# Patient Record
Sex: FEMALE | Race: BLACK OR AFRICAN AMERICAN | NOT HISPANIC OR LATINO | Employment: OTHER | URBAN - METROPOLITAN AREA
[De-identification: names, ages, dates, MRNs, and addresses within clinical notes are randomized per-mention and may not be internally consistent; named-entity substitution may affect disease eponyms.]

---

## 2017-07-12 ENCOUNTER — HISTORICAL (OUTPATIENT)
Dept: RADIOLOGY | Facility: HOSPITAL | Age: 67
End: 2017-07-12

## 2017-07-31 ENCOUNTER — HISTORICAL (OUTPATIENT)
Dept: RADIOLOGY | Facility: HOSPITAL | Age: 67
End: 2017-07-31

## 2017-12-25 ENCOUNTER — HOSPITAL ENCOUNTER (OUTPATIENT)
Dept: MEDSURG UNIT | Facility: HOSPITAL | Age: 67
End: 2017-12-27
Attending: INTERNAL MEDICINE | Admitting: INTERNAL MEDICINE

## 2017-12-25 LAB
ABS NEUT (OLG): 5.12 X10(3)/MCL (ref 2.1–9.2)
ALBUMIN SERPL-MCNC: 3.4 GM/DL (ref 3.4–5)
ALBUMIN/GLOB SERPL: 1 {RATIO}
ALP SERPL-CCNC: 60 UNIT/L (ref 38–126)
ALT SERPL-CCNC: 35 UNIT/L (ref 12–78)
APTT PPP: 35.5 SECOND(S) (ref 24.8–36.9)
AST SERPL-CCNC: 48 UNIT/L (ref 15–37)
BASOPHILS # BLD AUTO: 0 X10(3)/MCL (ref 0–0.2)
BASOPHILS NFR BLD AUTO: 0 %
BILIRUB SERPL-MCNC: 1 MG/DL (ref 0.2–1)
BILIRUBIN DIRECT+TOT PNL SERPL-MCNC: 0.3 MG/DL (ref 0–0.2)
BILIRUBIN DIRECT+TOT PNL SERPL-MCNC: 0.7 MG/DL (ref 0–0.8)
BUN SERPL-MCNC: 18 MG/DL (ref 7–18)
CALCIUM SERPL-MCNC: 8.2 MG/DL (ref 8.5–10.1)
CHLORIDE SERPL-SCNC: 101 MMOL/L (ref 98–107)
CO2 SERPL-SCNC: 25 MMOL/L (ref 21–32)
CREAT SERPL-MCNC: 1.11 MG/DL (ref 0.55–1.02)
EOSINOPHIL # BLD AUTO: 0.1 X10(3)/MCL (ref 0–0.9)
EOSINOPHIL NFR BLD AUTO: 1 %
ERYTHROCYTE [DISTWIDTH] IN BLOOD BY AUTOMATED COUNT: 13.6 % (ref 11.5–17)
GLOBULIN SER-MCNC: 3.3 GM/DL (ref 2.4–3.5)
GLUCOSE SERPL-MCNC: 87 MG/DL (ref 74–106)
HCT VFR BLD AUTO: 32.6 % (ref 37–47)
HGB BLD-MCNC: 10.6 GM/DL (ref 12–16)
INR PPP: 1.05 (ref 0–1.27)
LYMPHOCYTES # BLD AUTO: 2 X10(3)/MCL (ref 0.6–4.6)
LYMPHOCYTES NFR BLD AUTO: 26 %
MCH RBC QN AUTO: 31 PG (ref 27–31)
MCHC RBC AUTO-ENTMCNC: 32.5 GM/DL (ref 33–36)
MCV RBC AUTO: 95.3 FL (ref 80–94)
MONOCYTES # BLD AUTO: 0.3 X10(3)/MCL (ref 0.1–1.3)
MONOCYTES NFR BLD AUTO: 4 %
NEUTROPHILS # BLD AUTO: 5.12 X10(3)/MCL (ref 1.4–7.9)
NEUTROPHILS NFR BLD AUTO: 68 %
PLATELET # BLD AUTO: 173 X10(3)/MCL (ref 130–400)
PMV BLD AUTO: 10.5 FL (ref 9.4–12.4)
POTASSIUM SERPL-SCNC: 3.9 MMOL/L (ref 3.5–5.1)
PROT SERPL-MCNC: 6.7 GM/DL (ref 6.4–8.2)
PROTHROMBIN TIME: 14 SECOND(S) (ref 12.2–14.7)
RBC # BLD AUTO: 3.42 X10(6)/MCL (ref 4.2–5.4)
SODIUM SERPL-SCNC: 135 MMOL/L (ref 136–145)
WBC # SPEC AUTO: 7.6 X10(3)/MCL (ref 4.5–11.5)

## 2017-12-26 LAB
ABS NEUT (OLG): 2.92 X10(3)/MCL (ref 2.1–9.2)
BASOPHILS # BLD AUTO: 0 X10(3)/MCL (ref 0–0.2)
BASOPHILS NFR BLD AUTO: 0 %
BUN SERPL-MCNC: 11 MG/DL (ref 7–18)
CALCIUM SERPL-MCNC: 7.8 MG/DL (ref 8.5–10.1)
CHLORIDE SERPL-SCNC: 104 MMOL/L (ref 98–107)
CO2 SERPL-SCNC: 24 MMOL/L (ref 21–32)
CREAT SERPL-MCNC: 0.76 MG/DL (ref 0.55–1.02)
EOSINOPHIL # BLD AUTO: 0.2 X10(3)/MCL (ref 0–0.9)
EOSINOPHIL NFR BLD AUTO: 3 %
ERYTHROCYTE [DISTWIDTH] IN BLOOD BY AUTOMATED COUNT: 13.5 % (ref 11.5–17)
GLUCOSE SERPL-MCNC: 74 MG/DL (ref 74–106)
HCT VFR BLD AUTO: 28.8 % (ref 37–47)
HGB BLD-MCNC: 9.2 GM/DL (ref 12–16)
LYMPHOCYTES # BLD AUTO: 1.6 X10(3)/MCL (ref 0.6–4.6)
LYMPHOCYTES NFR BLD AUTO: 32 %
MCH RBC QN AUTO: 30.8 PG (ref 27–31)
MCHC RBC AUTO-ENTMCNC: 31.9 GM/DL (ref 33–36)
MCV RBC AUTO: 96.3 FL (ref 80–94)
MONOCYTES # BLD AUTO: 0.3 X10(3)/MCL (ref 0.1–1.3)
MONOCYTES NFR BLD AUTO: 5 %
NEUTROPHILS # BLD AUTO: 2.92 X10(3)/MCL (ref 1.4–7.9)
NEUTROPHILS NFR BLD AUTO: 59 %
PLATELET # BLD AUTO: 160 X10(3)/MCL (ref 130–400)
PMV BLD AUTO: 10.2 FL (ref 9.4–12.4)
POTASSIUM SERPL-SCNC: 3.8 MMOL/L (ref 3.5–5.1)
RBC # BLD AUTO: 2.99 X10(6)/MCL (ref 4.2–5.4)
SODIUM SERPL-SCNC: 137 MMOL/L (ref 136–145)
TROPONIN I SERPL-MCNC: <0.02 NG/ML (ref 0.02–0.49)
WBC # SPEC AUTO: 5 X10(3)/MCL (ref 4.5–11.5)

## 2017-12-27 LAB
ABS NEUT (OLG): 2.39 X10(3)/MCL (ref 2.1–9.2)
BASOPHILS # BLD AUTO: 0 X10(3)/MCL (ref 0–0.2)
BASOPHILS NFR BLD AUTO: 0 %
BUN SERPL-MCNC: 9 MG/DL (ref 7–18)
CALCIUM SERPL-MCNC: 8.4 MG/DL (ref 8.5–10.1)
CHLORIDE SERPL-SCNC: 103 MMOL/L (ref 98–107)
CO2 SERPL-SCNC: 28 MMOL/L (ref 21–32)
CREAT SERPL-MCNC: 0.81 MG/DL (ref 0.55–1.02)
EOSINOPHIL # BLD AUTO: 0.3 X10(3)/MCL (ref 0–0.9)
EOSINOPHIL NFR BLD AUTO: 6 %
ERYTHROCYTE [DISTWIDTH] IN BLOOD BY AUTOMATED COUNT: 13.2 % (ref 11.5–17)
GLUCOSE SERPL-MCNC: 82 MG/DL (ref 74–106)
HCT VFR BLD AUTO: 29.6 % (ref 37–47)
HGB BLD-MCNC: 9.5 GM/DL (ref 12–16)
LYMPHOCYTES # BLD AUTO: 1.8 X10(3)/MCL (ref 0.6–4.6)
LYMPHOCYTES NFR BLD AUTO: 38 %
MAGNESIUM SERPL-MCNC: 2.1 MG/DL (ref 1.8–2.4)
MCH RBC QN AUTO: 30.4 PG (ref 27–31)
MCHC RBC AUTO-ENTMCNC: 32.1 GM/DL (ref 33–36)
MCV RBC AUTO: 94.9 FL (ref 80–94)
MONOCYTES # BLD AUTO: 0.3 X10(3)/MCL (ref 0.1–1.3)
MONOCYTES NFR BLD AUTO: 6 %
NEUTROPHILS # BLD AUTO: 2.39 X10(3)/MCL (ref 2.1–9.2)
NEUTROPHILS NFR BLD AUTO: 50 %
PLATELET # BLD AUTO: 171 X10(3)/MCL (ref 130–400)
PMV BLD AUTO: 10.6 FL (ref 9.4–12.4)
POTASSIUM SERPL-SCNC: 4.3 MMOL/L (ref 3.5–5.1)
RBC # BLD AUTO: 3.12 X10(6)/MCL (ref 4.2–5.4)
SODIUM SERPL-SCNC: 136 MMOL/L (ref 136–145)
WBC # SPEC AUTO: 4.8 X10(3)/MCL (ref 4.5–11.5)

## 2018-05-29 ENCOUNTER — HISTORICAL (OUTPATIENT)
Dept: ADMINISTRATIVE | Facility: HOSPITAL | Age: 68
End: 2018-05-29

## 2018-05-29 LAB
ABS NEUT (OLG): 4.14 X10(3)/MCL (ref 2.1–9.2)
ALBUMIN SERPL-MCNC: 3.7 GM/DL (ref 3.4–5)
ALBUMIN/GLOB SERPL: 1.2 RATIO (ref 1.1–2)
ALP SERPL-CCNC: 87 UNIT/L (ref 38–126)
ALT SERPL-CCNC: 39 UNIT/L (ref 12–78)
APPEARANCE, UA: CLEAR
AST SERPL-CCNC: 23 UNIT/L (ref 15–37)
BACTERIA SPEC CULT: ABNORMAL /HPF
BASOPHILS # BLD AUTO: 0.1 X10(3)/MCL (ref 0–0.2)
BASOPHILS NFR BLD AUTO: 1 %
BILIRUB SERPL-MCNC: 0.9 MG/DL (ref 0.2–1)
BILIRUB UR QL STRIP: NEGATIVE
BILIRUBIN DIRECT+TOT PNL SERPL-MCNC: 0.2 MG/DL (ref 0–0.5)
BILIRUBIN DIRECT+TOT PNL SERPL-MCNC: 0.7 MG/DL (ref 0–0.8)
BUN SERPL-MCNC: 14 MG/DL (ref 7–18)
CALCIUM SERPL-MCNC: 9.4 MG/DL (ref 8.5–10.1)
CHLORIDE SERPL-SCNC: 106 MMOL/L (ref 98–107)
CHOLEST SERPL-MCNC: 124 MG/DL (ref 0–200)
CHOLEST/HDLC SERPL: 2 {RATIO} (ref 0–4)
CO2 SERPL-SCNC: 30 MMOL/L (ref 21–32)
COLOR UR: YELLOW
CREAT SERPL-MCNC: 1.06 MG/DL (ref 0.55–1.02)
DEPRECATED CALCIDIOL+CALCIFEROL SERPL-MC: 39 NG/ML (ref 30–80)
EOSINOPHIL # BLD AUTO: 0.4 X10(3)/MCL (ref 0–0.9)
EOSINOPHIL NFR BLD AUTO: 5 %
ERYTHROCYTE [DISTWIDTH] IN BLOOD BY AUTOMATED COUNT: 13.5 % (ref 11.5–17)
GLOBULIN SER-MCNC: 3.2 GM/DL (ref 2.4–3.5)
GLUCOSE (UA): NEGATIVE
GLUCOSE SERPL-MCNC: 84 MG/DL (ref 74–106)
HCT VFR BLD AUTO: 36.9 % (ref 37–47)
HDLC SERPL-MCNC: 61 MG/DL (ref 35–60)
HGB BLD-MCNC: 11.5 GM/DL (ref 12–16)
HGB UR QL STRIP: NEGATIVE
KETONES UR QL STRIP: NEGATIVE
LDLC SERPL CALC-MCNC: 52 MG/DL (ref 0–129)
LEUKOCYTE ESTERASE UR QL STRIP: ABNORMAL
LYMPHOCYTES # BLD AUTO: 2.5 X10(3)/MCL (ref 0.6–4.6)
LYMPHOCYTES NFR BLD AUTO: 33 %
MCH RBC QN AUTO: 30.7 PG (ref 27–31)
MCHC RBC AUTO-ENTMCNC: 31.2 GM/DL (ref 33–36)
MCV RBC AUTO: 98.7 FL (ref 80–94)
MONOCYTES # BLD AUTO: 0.4 X10(3)/MCL (ref 0.1–1.3)
MONOCYTES NFR BLD AUTO: 5 %
NEUTROPHILS # BLD AUTO: 4.14 X10(3)/MCL (ref 2.1–9.2)
NEUTROPHILS NFR BLD AUTO: 55 %
NITRITE UR QL STRIP: NEGATIVE
PH UR STRIP: 7 [PH] (ref 5–9)
PLATELET # BLD AUTO: 214 X10(3)/MCL (ref 130–400)
PMV BLD AUTO: 10 FL (ref 9.4–12.4)
POTASSIUM SERPL-SCNC: 4.1 MMOL/L (ref 3.5–5.1)
PROT SERPL-MCNC: 6.9 GM/DL (ref 6.4–8.2)
PROT UR QL STRIP: NEGATIVE
RBC # BLD AUTO: 3.74 X10(6)/MCL (ref 4.2–5.4)
RBC #/AREA URNS HPF: ABNORMAL /[HPF]
SODIUM SERPL-SCNC: 141 MMOL/L (ref 136–145)
SP GR UR STRIP: 1.02 (ref 1–1.03)
SQUAMOUS EPITHELIAL, UA: 7 /HPF (ref 0–4)
TRIGL SERPL-MCNC: 53 MG/DL (ref 30–150)
TSH SERPL-ACNC: 1.52 MIU/L (ref 0.36–3.74)
UROBILINOGEN UR STRIP-ACNC: 1
VLDLC SERPL CALC-MCNC: 11 MG/DL
WBC # SPEC AUTO: 7.5 X10(3)/MCL (ref 4.5–11.5)
WBC #/AREA URNS HPF: 5 /HPF (ref 0–3)

## 2018-06-12 ENCOUNTER — HISTORICAL (OUTPATIENT)
Dept: ADMINISTRATIVE | Facility: HOSPITAL | Age: 68
End: 2018-06-12

## 2018-06-12 LAB
ABS NEUT (OLG): 3.74 X10(3)/MCL (ref 2.1–9.2)
BASOPHILS # BLD AUTO: 0 X10(3)/MCL (ref 0–0.2)
BASOPHILS NFR BLD AUTO: 1 %
EOSINOPHIL # BLD AUTO: 0.5 X10(3)/MCL (ref 0–0.9)
EOSINOPHIL NFR BLD AUTO: 8 %
ERYTHROCYTE [DISTWIDTH] IN BLOOD BY AUTOMATED COUNT: 13.7 % (ref 11.5–17)
FOLATE SERPL-MCNC: 19.7 NG/ML (ref 3.1–17.5)
HCT VFR BLD AUTO: 35.8 % (ref 37–47)
HGB BLD-MCNC: 11 GM/DL (ref 12–16)
IRON SATN MFR SERPL: 29.4 % (ref 20–50)
IRON SERPL-MCNC: 87 MCG/DL (ref 50–175)
LYMPHOCYTES # BLD AUTO: 2.5 X10(3)/MCL (ref 0.6–4.6)
LYMPHOCYTES NFR BLD AUTO: 35 %
MCH RBC QN AUTO: 30.8 PG (ref 27–31)
MCHC RBC AUTO-ENTMCNC: 30.7 GM/DL (ref 33–36)
MCV RBC AUTO: 100.3 FL (ref 80–94)
MONOCYTES # BLD AUTO: 0.3 X10(3)/MCL (ref 0.1–1.3)
MONOCYTES NFR BLD AUTO: 5 %
NEUTROPHILS # BLD AUTO: 3.74 X10(3)/MCL (ref 2.1–9.2)
NEUTROPHILS NFR BLD AUTO: 52 %
PLATELET # BLD AUTO: 240 X10(3)/MCL (ref 130–400)
PMV BLD AUTO: 10.5 FL (ref 9.4–12.4)
RBC # BLD AUTO: 3.57 X10(6)/MCL (ref 4.2–5.4)
RET# (OHS): 0.08 X10^6/ML (ref 0.02–0.08)
RETICULOCYTE COUNT AUTOMATED (OLG): 2.2 % (ref 1.1–2.1)
TIBC SERPL-MCNC: 296 MCG/DL (ref 250–450)
TRANSFERRIN SERPL-MCNC: 231 MG/DL (ref 200–360)
VIT B12 SERPL-MCNC: 651 PG/ML (ref 193–986)
WBC # SPEC AUTO: 7.1 X10(3)/MCL (ref 4.5–11.5)

## 2018-07-20 ENCOUNTER — HISTORICAL (OUTPATIENT)
Dept: ADMINISTRATIVE | Facility: HOSPITAL | Age: 68
End: 2018-07-20

## 2018-07-20 LAB
ABS NEUT (OLG): 4.79 X10(3)/MCL (ref 2.1–9.2)
BASOPHILS # BLD AUTO: 0 X10(3)/MCL (ref 0–0.2)
BASOPHILS NFR BLD AUTO: 1 %
EOSINOPHIL # BLD AUTO: 0.5 X10(3)/MCL (ref 0–0.9)
EOSINOPHIL NFR BLD AUTO: 7 %
ERYTHROCYTE [DISTWIDTH] IN BLOOD BY AUTOMATED COUNT: 14.1 % (ref 11.5–17)
HCT VFR BLD AUTO: 34.5 % (ref 37–47)
HGB BLD-MCNC: 10.9 GM/DL (ref 12–16)
LYMPHOCYTES # BLD AUTO: 2 X10(3)/MCL (ref 0.6–4.6)
LYMPHOCYTES NFR BLD AUTO: 26 %
MCH RBC QN AUTO: 32 PG (ref 27–31)
MCHC RBC AUTO-ENTMCNC: 31.6 GM/DL (ref 33–36)
MCV RBC AUTO: 101.2 FL (ref 80–94)
MONOCYTES # BLD AUTO: 0.3 X10(3)/MCL (ref 0.1–1.3)
MONOCYTES NFR BLD AUTO: 3 %
NEUTROPHILS # BLD AUTO: 4.79 X10(3)/MCL (ref 1.4–7.9)
NEUTROPHILS NFR BLD AUTO: 63 %
PLATELET # BLD AUTO: 214 X10(3)/MCL (ref 130–400)
PMV BLD AUTO: 10.3 FL (ref 9.4–12.4)
RBC # BLD AUTO: 3.41 X10(6)/MCL (ref 4.2–5.4)
WBC # SPEC AUTO: 7.6 X10(3)/MCL (ref 4.5–11.5)

## 2018-08-18 LAB — HEMOCCULT SP2 STL QL: NEGATIVE

## 2018-08-20 ENCOUNTER — HISTORICAL (OUTPATIENT)
Dept: LAB | Facility: HOSPITAL | Age: 68
End: 2018-08-20

## 2018-09-05 ENCOUNTER — HISTORICAL (OUTPATIENT)
Dept: ADMINISTRATIVE | Facility: HOSPITAL | Age: 68
End: 2018-09-05

## 2018-09-05 LAB
ABS NEUT (OLG): 3.21 X10(3)/MCL (ref 2.1–9.2)
BASOPHILS # BLD AUTO: 0 X10(3)/MCL (ref 0–0.2)
BASOPHILS NFR BLD AUTO: 1 %
EOSINOPHIL # BLD AUTO: 0.5 X10(3)/MCL (ref 0–0.9)
EOSINOPHIL NFR BLD AUTO: 8 %
ERYTHROCYTE [DISTWIDTH] IN BLOOD BY AUTOMATED COUNT: 13.2 % (ref 11.5–17)
HCT VFR BLD AUTO: 37.6 % (ref 37–47)
HGB BLD-MCNC: 12 GM/DL (ref 12–16)
LYMPHOCYTES # BLD AUTO: 2.2 X10(3)/MCL (ref 0.6–4.6)
LYMPHOCYTES NFR BLD AUTO: 35 %
MCH RBC QN AUTO: 31.6 PG (ref 27–31)
MCHC RBC AUTO-ENTMCNC: 31.9 GM/DL (ref 33–36)
MCV RBC AUTO: 98.9 FL (ref 80–94)
MONOCYTES # BLD AUTO: 0.3 X10(3)/MCL (ref 0.1–1.3)
MONOCYTES NFR BLD AUTO: 5 %
NEUTROPHILS # BLD AUTO: 3.21 X10(3)/MCL (ref 1.4–7.9)
NEUTROPHILS NFR BLD AUTO: 51 %
PLATELET # BLD AUTO: 201 X10(3)/MCL (ref 130–400)
PMV BLD AUTO: 11.2 FL (ref 9.4–12.4)
RBC # BLD AUTO: 3.8 X10(6)/MCL (ref 4.2–5.4)
WBC # SPEC AUTO: 6.4 X10(3)/MCL (ref 4.5–11.5)

## 2018-09-19 ENCOUNTER — HISTORICAL (OUTPATIENT)
Dept: ADMINISTRATIVE | Facility: HOSPITAL | Age: 68
End: 2018-09-19

## 2018-10-01 ENCOUNTER — HISTORICAL (OUTPATIENT)
Dept: RADIOLOGY | Facility: HOSPITAL | Age: 68
End: 2018-10-01

## 2018-10-16 ENCOUNTER — HISTORICAL (OUTPATIENT)
Dept: ADMINISTRATIVE | Facility: HOSPITAL | Age: 68
End: 2018-10-16

## 2018-10-16 LAB
HBV SURFACE AG SERPL QL IA: NEGATIVE
HCV AB SERPL QL IA: NEGATIVE

## 2018-10-23 ENCOUNTER — HISTORICAL (OUTPATIENT)
Dept: RADIOLOGY | Facility: HOSPITAL | Age: 68
End: 2018-10-23

## 2018-11-26 ENCOUNTER — HISTORICAL (OUTPATIENT)
Dept: ADMINISTRATIVE | Facility: HOSPITAL | Age: 68
End: 2018-11-26

## 2018-11-26 LAB
ABS NEUT (OLG): 4.27 X10(3)/MCL (ref 2.1–9.2)
ALBUMIN SERPL-MCNC: 4 GM/DL (ref 3.4–5)
ALBUMIN/GLOB SERPL: 1.2 RATIO (ref 1.1–2)
ALP SERPL-CCNC: 76 UNIT/L (ref 38–126)
ALT SERPL-CCNC: 39 UNIT/L (ref 12–78)
AST SERPL-CCNC: 24 UNIT/L (ref 15–37)
BASOPHILS # BLD AUTO: 0 X10(3)/MCL (ref 0–0.2)
BASOPHILS NFR BLD AUTO: 0 %
BILIRUB SERPL-MCNC: 0.5 MG/DL (ref 0.2–1)
BILIRUBIN DIRECT+TOT PNL SERPL-MCNC: 0.2 MG/DL (ref 0–0.5)
BILIRUBIN DIRECT+TOT PNL SERPL-MCNC: 0.3 MG/DL (ref 0–0.8)
BUN SERPL-MCNC: 10 MG/DL (ref 7–18)
CALCIUM SERPL-MCNC: 9.5 MG/DL (ref 8.5–10.1)
CHLORIDE SERPL-SCNC: 106 MMOL/L (ref 98–107)
CO2 SERPL-SCNC: 30 MMOL/L (ref 21–32)
CREAT SERPL-MCNC: 0.96 MG/DL (ref 0.55–1.02)
EOSINOPHIL # BLD AUTO: 0.2 X10(3)/MCL (ref 0–0.9)
EOSINOPHIL NFR BLD AUTO: 2 %
ERYTHROCYTE [DISTWIDTH] IN BLOOD BY AUTOMATED COUNT: 13.4 % (ref 11.5–17)
ERYTHROCYTE [SEDIMENTATION RATE] IN BLOOD: 8 MM/HR (ref 0–20)
FOLATE SERPL-MCNC: 36.2 NG/ML (ref 3.1–17.5)
GLOBULIN SER-MCNC: 3.4 GM/DL (ref 2.4–3.5)
GLUCOSE SERPL-MCNC: 80 MG/DL (ref 74–106)
HCT VFR BLD AUTO: 39.6 % (ref 37–47)
HGB BLD-MCNC: 12.3 GM/DL (ref 12–16)
IRON SATN MFR SERPL: 24.8 % (ref 20–50)
IRON SERPL-MCNC: 85 MCG/DL (ref 50–175)
LYMPHOCYTES # BLD AUTO: 1.9 X10(3)/MCL (ref 0.6–4.6)
LYMPHOCYTES NFR BLD AUTO: 28 %
MCH RBC QN AUTO: 30.6 PG (ref 27–31)
MCHC RBC AUTO-ENTMCNC: 31.1 GM/DL (ref 33–36)
MCV RBC AUTO: 98.5 FL (ref 80–94)
MONOCYTES # BLD AUTO: 0.3 X10(3)/MCL (ref 0.1–1.3)
MONOCYTES NFR BLD AUTO: 5 %
NEUTROPHILS # BLD AUTO: 4.27 X10(3)/MCL (ref 2.1–9.2)
NEUTROPHILS NFR BLD AUTO: 64 %
PLATELET # BLD AUTO: 219 X10(3)/MCL (ref 130–400)
PMV BLD AUTO: 10.7 FL (ref 9.4–12.4)
POTASSIUM SERPL-SCNC: 4.1 MMOL/L (ref 3.5–5.1)
PROT SERPL-MCNC: 7.4 GM/DL (ref 6.4–8.2)
RBC # BLD AUTO: 4.02 X10(6)/MCL (ref 4.2–5.4)
SODIUM SERPL-SCNC: 141 MMOL/L (ref 136–145)
TIBC SERPL-MCNC: 343 MCG/DL (ref 250–450)
TRANSFERRIN SERPL-MCNC: 279 MG/DL (ref 200–360)
VIT B12 SERPL-MCNC: 1149 PG/ML (ref 193–986)
WBC # SPEC AUTO: 6.7 X10(3)/MCL (ref 4.5–11.5)

## 2018-12-14 ENCOUNTER — HISTORICAL (OUTPATIENT)
Dept: ADMINISTRATIVE | Facility: HOSPITAL | Age: 68
End: 2018-12-14

## 2018-12-14 LAB
ABS NEUT (OLG): 6.45 X10(3)/MCL (ref 2.1–9.2)
BASOPHILS # BLD AUTO: 0 X10(3)/MCL (ref 0–0.2)
BASOPHILS NFR BLD AUTO: 0 %
EOSINOPHIL # BLD AUTO: 0.2 X10(3)/MCL (ref 0–0.9)
EOSINOPHIL NFR BLD AUTO: 2 %
ERYTHROCYTE [DISTWIDTH] IN BLOOD BY AUTOMATED COUNT: 13.5 % (ref 11.5–17)
FOLATE SERPL-MCNC: 29.3 NG/ML (ref 3.1–17.5)
HCT VFR BLD AUTO: 38.3 % (ref 37–47)
HGB BLD-MCNC: 12.1 GM/DL (ref 12–16)
IRON SATN MFR SERPL: 25.4 % (ref 20–50)
IRON SERPL-MCNC: 87 MCG/DL (ref 50–175)
LYMPHOCYTES # BLD AUTO: 2.2 X10(3)/MCL (ref 0.6–4.6)
LYMPHOCYTES NFR BLD AUTO: 24 %
MCH RBC QN AUTO: 31.1 PG (ref 27–31)
MCHC RBC AUTO-ENTMCNC: 31.6 GM/DL (ref 33–36)
MCV RBC AUTO: 98.5 FL (ref 80–94)
MONOCYTES # BLD AUTO: 0.3 X10(3)/MCL (ref 0.1–1.3)
MONOCYTES NFR BLD AUTO: 3 %
NEUTROPHILS # BLD AUTO: 6.45 X10(3)/MCL (ref 2.1–9.2)
NEUTROPHILS NFR BLD AUTO: 71 %
PLATELET # BLD AUTO: 226 X10(3)/MCL (ref 130–400)
PMV BLD AUTO: 10.8 FL (ref 9.4–12.4)
RBC # BLD AUTO: 3.89 X10(6)/MCL (ref 4.2–5.4)
TIBC SERPL-MCNC: 343 MCG/DL (ref 250–450)
TRANSFERRIN SERPL-MCNC: 252 MG/DL (ref 200–360)
VIT B12 SERPL-MCNC: 1232 PG/ML (ref 193–986)
WBC # SPEC AUTO: 9.1 X10(3)/MCL (ref 4.5–11.5)

## 2018-12-20 ENCOUNTER — HISTORICAL (OUTPATIENT)
Dept: ADMINISTRATIVE | Facility: HOSPITAL | Age: 68
End: 2018-12-20

## 2019-03-21 ENCOUNTER — HISTORICAL (OUTPATIENT)
Dept: ADMINISTRATIVE | Facility: HOSPITAL | Age: 69
End: 2019-03-21

## 2019-03-21 LAB
ABS NEUT (OLG): 3.4 X10(3)/MCL (ref 2.1–9.2)
BASOPHILS # BLD AUTO: 0 X10(3)/MCL (ref 0–0.2)
BASOPHILS NFR BLD AUTO: 1 %
BUN SERPL-MCNC: 15 MG/DL (ref 7–18)
CALCIUM SERPL-MCNC: 8.7 MG/DL (ref 8.5–10.1)
CHLORIDE SERPL-SCNC: 107 MMOL/L (ref 98–107)
CHOLEST SERPL-MCNC: 127 MG/DL (ref 0–200)
CHOLEST/HDLC SERPL: 2.2 {RATIO} (ref 0–4)
CO2 SERPL-SCNC: 28 MMOL/L (ref 21–32)
CREAT SERPL-MCNC: 0.97 MG/DL (ref 0.55–1.02)
CREAT/UREA NIT SERPL: 15.5
EOSINOPHIL # BLD AUTO: 0.4 X10(3)/MCL (ref 0–0.9)
EOSINOPHIL NFR BLD AUTO: 5 %
ERYTHROCYTE [DISTWIDTH] IN BLOOD BY AUTOMATED COUNT: 13.1 % (ref 11.5–17)
GLUCOSE SERPL-MCNC: 88 MG/DL (ref 74–106)
HCT VFR BLD AUTO: 36.3 % (ref 37–47)
HDLC SERPL-MCNC: 58 MG/DL (ref 35–60)
HGB BLD-MCNC: 11.8 GM/DL (ref 12–16)
LDLC SERPL CALC-MCNC: 60 MG/DL (ref 0–129)
LYMPHOCYTES # BLD AUTO: 2.7 X10(3)/MCL (ref 0.6–4.6)
LYMPHOCYTES NFR BLD AUTO: 39 %
MCH RBC QN AUTO: 31.5 PG (ref 27–31)
MCHC RBC AUTO-ENTMCNC: 32.5 GM/DL (ref 33–36)
MCV RBC AUTO: 96.8 FL (ref 80–94)
MONOCYTES # BLD AUTO: 0.4 X10(3)/MCL (ref 0.1–1.3)
MONOCYTES NFR BLD AUTO: 5 %
NEUTROPHILS # BLD AUTO: 3.4 X10(3)/MCL (ref 2.1–9.2)
NEUTROPHILS NFR BLD AUTO: 50 %
PLATELET # BLD AUTO: 205 X10(3)/MCL (ref 130–400)
PMV BLD AUTO: 10.2 FL (ref 9.4–12.4)
POTASSIUM SERPL-SCNC: 3.7 MMOL/L (ref 3.5–5.1)
RBC # BLD AUTO: 3.75 X10(6)/MCL (ref 4.2–5.4)
SODIUM SERPL-SCNC: 141 MMOL/L (ref 136–145)
TRIGL SERPL-MCNC: 47 MG/DL (ref 30–150)
VLDLC SERPL CALC-MCNC: 9 MG/DL
WBC # SPEC AUTO: 6.8 X10(3)/MCL (ref 4.5–11.5)

## 2019-05-23 ENCOUNTER — HISTORICAL (OUTPATIENT)
Dept: RADIOLOGY | Facility: HOSPITAL | Age: 69
End: 2019-05-23

## 2019-09-23 ENCOUNTER — HISTORICAL (OUTPATIENT)
Dept: ADMINISTRATIVE | Facility: HOSPITAL | Age: 69
End: 2019-09-23

## 2019-09-23 LAB
ABS NEUT (OLG): 4.12 X10(3)/MCL (ref 2.1–9.2)
ALBUMIN SERPL-MCNC: 3.6 GM/DL (ref 3.4–5)
ALBUMIN/GLOB SERPL: 1.2 {RATIO}
ALP SERPL-CCNC: 78 UNIT/L (ref 38–126)
ALT SERPL-CCNC: 23 UNIT/L (ref 12–78)
APPEARANCE, UA: CLEAR
AST SERPL-CCNC: 13 UNIT/L (ref 15–37)
BACTERIA SPEC CULT: NORMAL /HPF
BASOPHILS # BLD AUTO: 0 X10(3)/MCL (ref 0–0.2)
BASOPHILS NFR BLD AUTO: 0 %
BILIRUB SERPL-MCNC: 0.8 MG/DL (ref 0.2–1)
BILIRUB UR QL STRIP: NEGATIVE
BILIRUBIN DIRECT+TOT PNL SERPL-MCNC: 0.1 MG/DL (ref 0–0.2)
BILIRUBIN DIRECT+TOT PNL SERPL-MCNC: 0.7 MG/DL (ref 0–0.8)
BUN SERPL-MCNC: 13 MG/DL (ref 7–18)
CALCIUM SERPL-MCNC: 9.3 MG/DL (ref 8.5–10.1)
CHLORIDE SERPL-SCNC: 106 MMOL/L (ref 98–107)
CHOLEST SERPL-MCNC: 119 MG/DL (ref 0–200)
CHOLEST/HDLC SERPL: 2.6 {RATIO} (ref 0–4)
CO2 SERPL-SCNC: 28 MMOL/L (ref 21–32)
COLOR UR: YELLOW
CREAT SERPL-MCNC: 0.93 MG/DL (ref 0.55–1.02)
DEPRECATED CALCIDIOL+CALCIFEROL SERPL-MC: 36.31 NG/ML (ref 30–80)
EOSINOPHIL # BLD AUTO: 0.4 X10(3)/MCL (ref 0–0.9)
EOSINOPHIL NFR BLD AUTO: 6 %
ERYTHROCYTE [DISTWIDTH] IN BLOOD BY AUTOMATED COUNT: 13.6 % (ref 11.5–17)
GLOBULIN SER-MCNC: 3.1 GM/DL (ref 2.4–3.5)
GLUCOSE (UA): NEGATIVE
GLUCOSE SERPL-MCNC: 79 MG/DL (ref 74–106)
HCT VFR BLD AUTO: 37.3 % (ref 37–47)
HDLC SERPL-MCNC: 46 MG/DL (ref 35–60)
HGB BLD-MCNC: 11.7 GM/DL (ref 12–16)
HGB UR QL STRIP: NEGATIVE
KETONES UR QL STRIP: NEGATIVE
LDLC SERPL CALC-MCNC: 48 MG/DL (ref 0–129)
LEUKOCYTE ESTERASE UR QL STRIP: NEGATIVE
LYMPHOCYTES # BLD AUTO: 2.3 X10(3)/MCL (ref 0.6–4.6)
LYMPHOCYTES NFR BLD AUTO: 32 %
MCH RBC QN AUTO: 30.9 PG (ref 27–31)
MCHC RBC AUTO-ENTMCNC: 31.4 GM/DL (ref 33–36)
MCV RBC AUTO: 98.4 FL (ref 80–94)
MONOCYTES # BLD AUTO: 0.4 X10(3)/MCL (ref 0.1–1.3)
MONOCYTES NFR BLD AUTO: 5 %
NEUTROPHILS # BLD AUTO: 4.12 X10(3)/MCL (ref 2.1–9.2)
NEUTROPHILS NFR BLD AUTO: 56 %
NITRITE UR QL STRIP: NEGATIVE
PH UR STRIP: 5.5 [PH] (ref 5–9)
PLATELET # BLD AUTO: 227 X10(3)/MCL (ref 130–400)
PMV BLD AUTO: 10.5 FL (ref 9.4–12.4)
POTASSIUM SERPL-SCNC: 3.7 MMOL/L (ref 3.5–5.1)
PROT SERPL-MCNC: 6.7 GM/DL (ref 6.4–8.2)
PROT UR QL STRIP: NEGATIVE
RBC # BLD AUTO: 3.79 X10(6)/MCL (ref 4.2–5.4)
RBC #/AREA URNS HPF: NORMAL /[HPF]
SODIUM SERPL-SCNC: 138 MMOL/L (ref 136–145)
SP GR UR STRIP: 1.01 (ref 1–1.03)
SQUAMOUS EPITHELIAL, UA: NORMAL
TRIGL SERPL-MCNC: 125 MG/DL (ref 30–150)
TSH SERPL-ACNC: 1.18 MIU/L (ref 0.36–3.74)
UROBILINOGEN UR STRIP-ACNC: 1
VLDLC SERPL CALC-MCNC: 25 MG/DL
WBC # SPEC AUTO: 7.3 X10(3)/MCL (ref 4.5–11.5)
WBC #/AREA URNS HPF: NORMAL /[HPF]

## 2019-10-03 ENCOUNTER — HISTORICAL (OUTPATIENT)
Dept: RADIOLOGY | Facility: HOSPITAL | Age: 69
End: 2019-10-03

## 2020-06-09 ENCOUNTER — HISTORICAL (OUTPATIENT)
Dept: RADIOLOGY | Facility: HOSPITAL | Age: 70
End: 2020-06-09

## 2020-06-17 ENCOUNTER — HISTORICAL (OUTPATIENT)
Dept: ADMINISTRATIVE | Facility: HOSPITAL | Age: 70
End: 2020-06-17

## 2020-06-17 LAB
APPEARANCE, UA: CLEAR
BACTERIA SPEC CULT: NORMAL /HPF
BILIRUB UR QL STRIP: NEGATIVE
COLOR UR: YELLOW
GLUCOSE (UA): NEGATIVE
HGB UR QL STRIP: NEGATIVE
KETONES UR QL STRIP: NEGATIVE
LEUKOCYTE ESTERASE UR QL STRIP: NEGATIVE
NITRITE UR QL STRIP: NEGATIVE
PH UR STRIP: 6.5 [PH] (ref 5–9)
PROT UR QL STRIP: NEGATIVE
RBC #/AREA URNS HPF: NORMAL /[HPF]
SP GR UR STRIP: 1.02 (ref 1–1.03)
SQUAMOUS EPITHELIAL, UA: NORMAL
UROBILINOGEN UR STRIP-ACNC: 1
WBC #/AREA URNS HPF: NORMAL /[HPF]

## 2020-08-11 ENCOUNTER — HISTORICAL (OUTPATIENT)
Dept: ADMINISTRATIVE | Facility: HOSPITAL | Age: 70
End: 2020-08-11

## 2020-08-11 LAB
ABS NEUT (OLG): 4.5 X10(3)/MCL (ref 2.1–9.2)
ALBUMIN SERPL-MCNC: 3.8 GM/DL (ref 3.4–5)
ALBUMIN/GLOB SERPL: 1.2 RATIO (ref 1.1–2)
ALP SERPL-CCNC: 94 UNIT/L (ref 40–150)
ALT SERPL-CCNC: 20 UNIT/L (ref 0–55)
AST SERPL-CCNC: 21 UNIT/L (ref 5–34)
BASOPHILS # BLD AUTO: 0 X10(3)/MCL (ref 0–0.2)
BASOPHILS NFR BLD AUTO: 1 %
BILIRUB SERPL-MCNC: 0.4 MG/DL
BILIRUBIN DIRECT+TOT PNL SERPL-MCNC: 0.2 MG/DL (ref 0–0.5)
BILIRUBIN DIRECT+TOT PNL SERPL-MCNC: 0.2 MG/DL (ref 0–0.8)
BUN SERPL-MCNC: 13.8 MG/DL (ref 9.8–20.1)
CALCIUM SERPL-MCNC: 9.6 MG/DL (ref 8.4–10.2)
CHLORIDE SERPL-SCNC: 103 MMOL/L (ref 98–107)
CHOLEST SERPL-MCNC: 133 MG/DL
CHOLEST/HDLC SERPL: 4 {RATIO} (ref 0–5)
CO2 SERPL-SCNC: 29 MMOL/L (ref 23–31)
CREAT SERPL-MCNC: 0.9 MG/DL (ref 0.55–1.02)
EOSINOPHIL # BLD AUTO: 0.5 X10(3)/MCL (ref 0–0.9)
EOSINOPHIL NFR BLD AUTO: 6 %
ERYTHROCYTE [DISTWIDTH] IN BLOOD BY AUTOMATED COUNT: 13.2 % (ref 11.5–17)
FERRITIN SERPL-MCNC: 43.78 NG/ML (ref 4.63–204)
GLOBULIN SER-MCNC: 3.2 GM/DL (ref 2.4–3.5)
GLUCOSE SERPL-MCNC: 87 MG/DL (ref 82–115)
HCT VFR BLD AUTO: 39.8 % (ref 37–47)
HDLC SERPL-MCNC: 37 MG/DL (ref 35–60)
HGB BLD-MCNC: 12.5 GM/DL (ref 12–16)
IRON SATN MFR SERPL: 28 % (ref 20–50)
IRON SERPL-MCNC: 75 UG/DL (ref 50–170)
LDLC SERPL CALC-MCNC: 76 MG/DL (ref 50–140)
LYMPHOCYTES # BLD AUTO: 2.3 X10(3)/MCL (ref 0.6–4.6)
LYMPHOCYTES NFR BLD AUTO: 30 %
MCH RBC QN AUTO: 30.8 PG (ref 27–31)
MCHC RBC AUTO-ENTMCNC: 31.4 GM/DL (ref 33–36)
MCV RBC AUTO: 98 FL (ref 80–94)
MONOCYTES # BLD AUTO: 0.4 X10(3)/MCL (ref 0.1–1.3)
MONOCYTES NFR BLD AUTO: 5 %
NEUTROPHILS # BLD AUTO: 4.5 X10(3)/MCL (ref 2.1–9.2)
NEUTROPHILS NFR BLD AUTO: 58 %
PLATELET # BLD AUTO: 244 X10(3)/MCL (ref 130–400)
PMV BLD AUTO: 11 FL (ref 9.4–12.4)
POTASSIUM SERPL-SCNC: 4.1 MMOL/L (ref 3.5–5.1)
PROT SERPL-MCNC: 7 GM/DL (ref 5.8–7.6)
RBC # BLD AUTO: 4.06 X10(6)/MCL (ref 4.2–5.4)
RET# (OHS): 0.06 X10^6/ML (ref 0.02–0.08)
RETICULOCYTE COUNT AUTOMATED (OLG): 1.4 % (ref 1.1–2.1)
SODIUM SERPL-SCNC: 139 MMOL/L (ref 136–145)
TIBC SERPL-MCNC: 196 UG/DL (ref 70–310)
TIBC SERPL-MCNC: 271 UG/DL (ref 250–450)
TRANSFERRIN SERPL-MCNC: 232 MG/DL (ref 173–360)
TRIGL SERPL-MCNC: 100 MG/DL (ref 37–140)
VLDLC SERPL CALC-MCNC: 20 MG/DL
WBC # SPEC AUTO: 7.8 X10(3)/MCL (ref 4.5–11.5)

## 2020-12-07 ENCOUNTER — HISTORICAL (OUTPATIENT)
Dept: RADIOLOGY | Facility: HOSPITAL | Age: 70
End: 2020-12-07

## 2021-01-26 ENCOUNTER — HISTORICAL (OUTPATIENT)
Dept: RADIOLOGY | Facility: HOSPITAL | Age: 71
End: 2021-01-26

## 2021-03-23 ENCOUNTER — HISTORICAL (OUTPATIENT)
Dept: ADMINISTRATIVE | Facility: HOSPITAL | Age: 71
End: 2021-03-23

## 2021-03-23 LAB
ABS NEUT (OLG): 5.45 X10(3)/MCL (ref 2.1–9.2)
BASOPHILS # BLD AUTO: 0 X10(3)/MCL (ref 0–0.2)
BASOPHILS NFR BLD AUTO: 0 %
BUN SERPL-MCNC: 10.6 MG/DL (ref 9.8–20.1)
CALCIUM SERPL-MCNC: 9.3 MG/DL (ref 8.4–10.2)
CHLORIDE SERPL-SCNC: 104 MMOL/L (ref 98–107)
CHOLEST SERPL-MCNC: 145 MG/DL
CHOLEST/HDLC SERPL: 4 {RATIO} (ref 0–5)
CO2 SERPL-SCNC: 31 MMOL/L (ref 23–31)
CREAT SERPL-MCNC: 1.04 MG/DL (ref 0.55–1.02)
CREAT/UREA NIT SERPL: 10
DEPRECATED CALCIDIOL+CALCIFEROL SERPL-MC: 49.6 NG/ML (ref 30–80)
EOSINOPHIL # BLD AUTO: 0.3 X10(3)/MCL (ref 0–0.9)
EOSINOPHIL NFR BLD AUTO: 4 %
ERYTHROCYTE [DISTWIDTH] IN BLOOD BY AUTOMATED COUNT: 13.3 % (ref 11.5–17)
GLUCOSE SERPL-MCNC: 83 MG/DL (ref 82–115)
HCT VFR BLD AUTO: 39.6 % (ref 37–47)
HDLC SERPL-MCNC: 41 MG/DL (ref 35–60)
HGB BLD-MCNC: 12.6 GM/DL (ref 12–16)
LDLC SERPL CALC-MCNC: 83 MG/DL (ref 50–140)
LYMPHOCYTES # BLD AUTO: 1.9 X10(3)/MCL (ref 0.6–4.6)
LYMPHOCYTES NFR BLD AUTO: 23 %
MCH RBC QN AUTO: 31.2 PG (ref 27–31)
MCHC RBC AUTO-ENTMCNC: 31.8 GM/DL (ref 33–36)
MCV RBC AUTO: 98 FL (ref 80–94)
MONOCYTES # BLD AUTO: 0.4 X10(3)/MCL (ref 0.1–1.3)
MONOCYTES NFR BLD AUTO: 5 %
NEUTROPHILS # BLD AUTO: 5.45 X10(3)/MCL (ref 2.1–9.2)
NEUTROPHILS NFR BLD AUTO: 67 %
PLATELET # BLD AUTO: 252 X10(3)/MCL (ref 130–400)
PMV BLD AUTO: 11.1 FL (ref 9.4–12.4)
POTASSIUM SERPL-SCNC: 4.2 MMOL/L (ref 3.5–5.1)
RBC # BLD AUTO: 4.04 X10(6)/MCL (ref 4.2–5.4)
SODIUM SERPL-SCNC: 141 MMOL/L (ref 136–145)
TRIGL SERPL-MCNC: 105 MG/DL (ref 37–140)
VLDLC SERPL CALC-MCNC: 21 MG/DL
WBC # SPEC AUTO: 8.1 X10(3)/MCL (ref 4.5–11.5)

## 2022-02-27 ENCOUNTER — HISTORICAL (OUTPATIENT)
Dept: ADMINISTRATIVE | Facility: HOSPITAL | Age: 72
End: 2022-02-27

## 2022-03-24 ENCOUNTER — HISTORICAL (OUTPATIENT)
Dept: ADMINISTRATIVE | Facility: HOSPITAL | Age: 72
End: 2022-03-24

## 2022-03-24 LAB
ABS NEUT (OLG): 5.04 (ref 2.1–9.2)
ALBUMIN SERPL-MCNC: 3.8 G/DL (ref 3.4–4.8)
ALBUMIN/GLOB SERPL: 1.2 {RATIO} (ref 1.1–2)
ALP SERPL-CCNC: 84 U/L (ref 40–150)
ALT SERPL-CCNC: 13 U/L (ref 0–55)
APPEARANCE, UA: CLEAR
AST SERPL-CCNC: 14 U/L (ref 5–34)
BACTERIA SPEC CULT: NORMAL
BASOPHILS # BLD AUTO: 0 10*3/UL (ref 0–0.2)
BASOPHILS NFR BLD AUTO: 0 %
BILIRUB SERPL-MCNC: 0.4 MG/DL
BILIRUB UR QL STRIP: NEGATIVE
BILIRUBIN DIRECT+TOT PNL SERPL-MCNC: 0.2 (ref 0–0.5)
BILIRUBIN DIRECT+TOT PNL SERPL-MCNC: 0.2 (ref 0–0.8)
BUDDING YEAST: NORMAL
BUN SERPL-MCNC: 12.3 MG/DL (ref 9.8–20.1)
CALCIUM SERPL-MCNC: 9.9 MG/DL (ref 8.7–10.5)
CASTS, UA: NORMAL
CHLORIDE SERPL-SCNC: 105 MMOL/L (ref 98–107)
CHOLEST SERPL-MCNC: 135 MG/DL
CHOLEST/HDLC SERPL: 3 {RATIO} (ref 0–5)
CO2 SERPL-SCNC: 28 MMOL/L (ref 23–31)
COLOR UR: YELLOW
CREAT SERPL-MCNC: 0.82 MG/DL (ref 0.55–1.02)
CRYSTALS: NORMAL
DEPRECATED CALCIDIOL+CALCIFEROL SERPL-MC: 51.7 NG/ML (ref 30–80)
EOSINOPHIL # BLD AUTO: 0.2 10*3/UL (ref 0–0.9)
EOSINOPHIL NFR BLD AUTO: 3 %
ERYTHROCYTE [DISTWIDTH] IN BLOOD BY AUTOMATED COUNT: 13.5 % (ref 11.5–17)
GLOBULIN SER-MCNC: 3.1 G/DL (ref 2.4–3.5)
GLUCOSE (UA): NEGATIVE
GLUCOSE SERPL-MCNC: 82 MG/DL (ref 82–115)
HCT VFR BLD AUTO: 39.7 % (ref 37–47)
HDLC SERPL-MCNC: 43 MG/DL (ref 35–60)
HEMOLYSIS INTERF INDEX SERPL-ACNC: 1
HGB BLD-MCNC: 12.9 G/DL (ref 12–16)
HGB UR QL STRIP: NEGATIVE
ICTERIC INTERF INDEX SERPL-ACNC: 0
KETONES UR QL STRIP: NEGATIVE
LDLC SERPL CALC-MCNC: 80 MG/DL (ref 50–140)
LEUKOCYTE ESTERASE UR QL STRIP: NEGATIVE
LIPEMIC INTERF INDEX SERPL-ACNC: 4
LYMPHOCYTES # BLD AUTO: 1.9 10*3/UL (ref 0.6–4.6)
LYMPHOCYTES NFR BLD AUTO: 24 %
MANUAL DIFF? (OHS): NO
MCH RBC QN AUTO: 31.5 PG (ref 27–31)
MCHC RBC AUTO-ENTMCNC: 32.5 G/DL (ref 33–36)
MCV RBC AUTO: 96.8 FL (ref 80–94)
MONOCYTES # BLD AUTO: 0.4 10*3/UL (ref 0.1–1.3)
MONOCYTES NFR BLD AUTO: 6 %
NEUTROPHILS # BLD AUTO: 5.04 10*3/UL (ref 2.1–9.2)
NEUTROPHILS NFR BLD AUTO: 66 %
NITRITE UR QL STRIP: NEGATIVE
PH UR STRIP: 6 [PH] (ref 5–9)
PLATELET # BLD AUTO: 273 10*3/UL (ref 130–400)
PMV BLD AUTO: 10.8 FL (ref 9.4–12.4)
POTASSIUM SERPL-SCNC: 4.3 MMOL/L (ref 3.5–5.1)
PROT SERPL-MCNC: 6.9 G/DL (ref 5.8–7.6)
PROT UR QL STRIP: NEGATIVE
RBC # BLD AUTO: 4.1 10*6/UL (ref 4.2–5.4)
RBC #/AREA URNS HPF: NORMAL /[HPF] (ref 0–2)
SMALL ROUND CELLS, UA: NORMAL
SODIUM SERPL-SCNC: 143 MMOL/L (ref 136–145)
SP GR UR STRIP: 1.02 (ref 1–1.03)
SPERM URNS QL MICRO: NORMAL
SQUAMOUS EPITHELIAL, UA: NORMAL (ref 0–4)
TRIGL SERPL-MCNC: 62 MG/DL (ref 37–140)
UROBILINOGEN UR STRIP-ACNC: 1
VLDLC SERPL CALC-MCNC: 12 MG/DL
WBC # SPEC AUTO: 7.6 10*3/UL (ref 4.5–11.5)
WBC #/AREA URNS HPF: NORMAL /[HPF] (ref 0–2)

## 2022-04-30 NOTE — CONSULTS
DATE OF CONSULTATION:  12/25/2017    ATTENDING PHYSICIAN:  Soni Mcgowan MD  CONSULTING PHYSICIAN:  Jeff Ayala MD    REASON FOR CONSULTATION:  The patient was seen at the request of Dr. Mcgowan secondary to intracranial injury.    HISTORY OF PRESENT ILLNESS:  The patient is a 67-year-old female with a history of hypertension and hyperlipidemia who is reportedly on multiple medications and her blood pressure significantly dropped causing a syncopal episode when she fell.  The patient's son was in the room.  It was at the time reported that she was making coffee and all of a sudden she blacked out and fell to the ground.  She did not lose consciousness.  She was transported to the emergency department and underwent CT scan of her brain demonstrating an acute anterior interhemispheric fissure hemorrhage.  I was consulted for my opinion with regard to the current circumstances.    PAST MEDICAL HISTORY:  As above.    PAST SURGICAL HISTORY:  C sections and a hysterectomy.    FAMILY HISTORY:  Mother has hypertension.  Brother has hyperlipidemia.    SOCIAL HISTORY:  Denies any alcohol.  She smokes 1 pack per day.  No drugs or alcohol.    REVIEW OF SYSTEMS:  Other than was documented on the HPI, is reportedly negative.    ALLERGIES:  To amlodipine, Norvasc, and prednisone.    PHYSICAL EXAMINATION:  GENERAL:  She appears her stated age.  Her temperature is 37.5, blood pressure 113/65, pulse rate is 85, respirations 16 and unlabored.     HEENT:  Normocephalic, atraumatic.  Nose midline.  TMs visualized bilaterally.  She has average dentition.   LUNGS:  Clear to auscultation bilaterally.   CARDIOVASCULAR:  S1, S2.  Costovertebral within normal limits.   SPINAL:  She has no evidence of any prominent scoliosis or gross kyphosis.   HIPS:  Negative Rex's, Amadeo's.   NECK:  She has full range of motion.  Negative Lhermitte's.  Negative Spurling's.     ABDOMEN:  Nontender, nondistended.     EXTREMITIES:  No  clubbing, cyanosis, or edema noted.  She wears corrective lenses.   NEUROLOGIC EXAMINATION:  She is alert and oriented to time, place, and situation.  She has no word-finding difficulties.  Her speech is clear and spontaneous.  Cranial nerve examination is full to confrontation.  PERRLA, EOMI, without nystagmus.  Good jaw bite and opening strength.  Facial sensation and corneal reflexes appear to be intact.  She has no obvious facial asymmetry or myokymia.  Tongue, palate, and uvula are midline without deviation.  Shoulder shrug is symmetric.  Motor examination is 5/5 in the bilateral upper and lower extremities in all muscle groups tested.  She has no evidence of clonus, Conway's, or pronator drift.    IMAGING:  On review of the images, CT scan of her brain dated 12/25/2017 demonstrates areas of acute interhemispheric fissure hemorrhage approximately 4 mm.  There is no evidence of any mass effect.  Basal cisterns are patent.  There is no evidence of any hydrocephalus.    ASSESSMENT/PLAN:  I spent a good deal of time speaking to the patient with regard to her circumstances.  She will not require any neurosurgical interventions.  As stated, I have advised her to hold her aspirin for a period of 14 days, in which case she should be able to safely restart.  I will see her back in my office in followup in 3 weeks with a followup CT scan of her brain without contrast.        ______________________________  Jeff Ayala MD    NASREEN/UD  DD:  12/26/2017  Time:  08:34PM  DT:  12/26/2017  Time:  09:20PM  Job #:  [id number]

## 2022-04-30 NOTE — ED PROVIDER NOTES
Patient:   Melina Lloyd             MRN: [id number]            FIN: [id number]               Age:   67 years     Sex:  Female     :  1950   Associated Diagnoses:   Influenza A; Syncope/Near syncope   Author:   Mario PEÑA, Vicky Chaudhry      Basic Information   Time seen: Date & time 2017 06:03:00.   History source: Patient.   Arrival mode: Ambulance.   History limitation: None.   Additional information: Chief Complaint from Nursing Triage Note : Chief Complaint   2017 5:52 CST      Chief Complaint           dx with Flu yesterday in ER. syncopal episode yesterday before being seen. son reports syncopal episode this am while pt trying to fix coffee. pt denies cp/sob. states feels fine at this time. states has bump to head from 's fall but not today.    2017 9:38 CST      Chief Complaint           here by [facility]; syncopal episode; confusion. . Headache. Cough. Fever since Friday. Frequency of urination.  .      History of Present Illness   The patient presents with     67 year old female presents to the ED via EMS after x2 episodes of syncope onset yesterday. The patient states that she had a syncopal episode yesterday with subsequent fall, hitting the back of her head. After the fall, she reports feeling dazed, asking questions repetitively, then returned to baseline upon arriving at [facility] for evaluation. No seizure activity reported. She was evaluated at [facility] yesterday where she was diagnosed with the flu. No imaging of the head performed. This morning while making coffee, she had another syncopal episode with subsequent fall, again hitting her head. No prodromal symptoms. The patient reports associated minimal pain to the posterior head where the trauma occurred, no HA or confusion today. No CP, SOB, vomiting or diarrhea, however appetite is decreased. She also reports right rib pain described as soreness when coughing. The patient denies preceding lightheadedness,  palpitations, abnormal bleeding, abdominal pain, neck pain, or back pain. The patient continues to smoke. The patient has a history of HTN & HLD.      .  The onset was 2  days ago.  The course/duration of symptoms is episodic: 2  total episodes.  The location where the incident occurred was at home.  The exacerbating factor is none.  The relieving factor is none.  Risk factors consist of hypertension.  Prior episodes: Yesterday ×1.  Therapy today: none.  Preceding symptoms: not lightheaded, no nausea, no headache no vision change.  Associated symptoms: none.  Associated injury to the none.  Additional history: none.        Review of Systems   Constitutional symptoms:  Decreased appetite, Malaise, No fever,    Skin symptoms:  No rash,    Eye symptoms:  No recent vision problems,    ENMT symptoms:  No sore throat,    Respiratory symptoms:  Cough, No shortness of breath,    Cardiovascular symptoms:  Syncope (x2), no chest pain, no palpitations, no diaphoresis, no peripheral edema.    Gastrointestinal symptoms:  No abdominal pain, no nausea, no vomiting, no diarrhea, no constipation.    Genitourinary symptoms:  No dysuria, no hematuria.    Musculoskeletal symptoms:  rib pain that she states is due to coughing.   Neurologic symptoms:  Numbness, Posterior scalp pain, no headache, no dizziness, no altered level of consciousness, no tingling, no weakness.    Endocrine symptoms:  Polyuria.   Hematologic/Lymphatic symptoms:  Bleeding tendency negative,       Health Status   Allergies:    Allergic Reactions (Selected)  Severity Not Documented  AmLODIPine- Itch.  Norvasc- Itching.  PredniSONE- Itching..   Medications:  (Selected)   Inpatient Medications  Completed  Normal Saline (0.9% NS) IV: 1,000 mL, 1,000 mL, IV, 1000 mL/hr, start date 12/25/17 6:16:00 CST, stop date 12/25/17 6:16:00 CST, STAT  Discontinued  NS (0.9% Sodium Chloride) Infusion 1,000 mL: 1,000 mL, 1,000 mL, IV, 1,000 mL/hr, start date 12/25/17 6:22:00  CST  Prescriptions  Prescribed  Tamiflu 75 mg oral capsule: 75 mg = 1 cap(s), Oral, BID, X 5 day(s), # 10 cap(s), 0 Refill(s)  albuterol CFC free 90 mcg/inh inhalation aerosol with adapter: 2 puff(s), INH, QID, PRN PRN for wheezing, # 1 EA, 0 Refill(s)  codeine-guaifenesin 10 mg-100 mg/5 mL oral syrup: 5 mL, Oral, q4hr, PRN PRN as needed for cough, # 120 mL, 0 Refill(s)  nitroglycerin 0.4 mg sublingual TAB: 0.4 mg = 1 tab(s), SL, q5min, PRN PRN for chest pain, If chest pain not relieved in 5 minutes after first dose, seek immediate medical attention, # 100 tab(s), 3 Refill(s)  Documented Medications  Documented  Pepcid 20 mg oral tablet: 20 mg = 1 tab(s), Oral, Once-NOW, 0 Refill(s)  Solumedrol IV push / IM: 125 mg = 2 mL, IM, Once-NOW, 0 Refill(s)  aspirin 81 mg oral tablet: 81 mg = 1 tab(s), Oral, Daily, # 90 tab(s), 0 Refill(s)  atorvastatin 80 mg oral tablet: 80 mg = 1 tab(s), Oral, Daily, # 30 tab(s), 0 Refill(s)  hydroCHLOROthiazide 12.5 mg oral capsule: 12.5 mg = 1 cap(s), Oral, Daily, # 90 cap(s), 0 Refill(s).   Immunizations: Per nurse's notes.      Past Medical/ Family/ Social History   Medical history:    Active  Hypertension ([id number]), HLD.   Surgical history:    Hysterectomy (SNOMED CT 523515894) in  at 32 Years.   section (SNOMED CT 41128499) in  at 29 Years.   section (SNOMED CT 49679368) in  at 22 Years.  denies weight loss surgery..   Family history:    Diabetes mellitus type 2  Brother  Hypertension.  Mother  .   Social history: Alcohol use: Denies, Tobacco use: Regularly, Drug use: Denies.   Problem list: Per nurse's notes.      Physical Examination               Vital Signs   Vital Signs   2017 6:50 CST      Peripheral Pulse Rate     88 bpm                             Heart Rate Monitored      88 bpm                             Respiratory Rate          15 br/min                             SpO2                      100 %                              Oxygen Therapy            Room air                             Systolic Blood Pressure   111 mmHg                             Diastolic Blood Pressure  64 mmHg                             Mean Arterial Pressure, Cuff              80 mmHg    12/25/2017 6:07 CST      SpO2                      98 %                             Oxygen Therapy            Room air    12/25/2017 5:52 CST      Temperature Oral          36.9 DegC                             Temperature Oral (calculated)             98.42 DegF                             Peripheral Pulse Rate     86 bpm                             Respiratory Rate          17 br/min                             SpO2                      99 %                             Oxygen Therapy            Room air                             Systolic Blood Pressure   99 mmHg                             Diastolic Blood Pressure  66 mmHg    12/24/2017 12:26 CST     Peripheral Pulse Rate     86 bpm                             Heart Rate Monitored      86 bpm                             Respiratory Rate          21 br/min                             SpO2                      99 %                             Systolic Blood Pressure   122 mmHg                             Diastolic Blood Pressure  77 mmHg                             Mean Arterial Pressure, Cuff              92 mmHg    12/24/2017 11:12 CST     Peripheral Pulse Rate     120 bpm  HI                             Respiratory Rate          16 br/min                             SpO2                      100 %                             Oxygen Therapy            Room air                             Systolic Blood Pressure   125 mmHg                             Diastolic Blood Pressure  83 mmHg                             Mean Arterial Pressure, Cuff              97 mmHg    12/24/2017 9:38 CST      Temperature Oral          37.4 DegC                             Temperature Oral (calculated)             99.32 DegF                              Peripheral Pulse Rate     114 bpm  HI                             Respiratory Rate          12 br/min                             SpO2                      100 %                             Oxygen Therapy            Room air                             Systolic Blood Pressure   131 mmHg                             Diastolic Blood Pressure  91 mmHg  HI  .   Measurements   12/25/2017 5:52 CST      Weight Dosing             45.5 kg                             Weight Measured and Calculated in Lbs     100.31 lb                             Weight Estimated          45.5 kg                             Height/Length Dosing      152 cm                             Height/Length Estimated   152 cm                             Body Mass Index Estimated 19.69 kg/m2    12/24/2017 9:38 CST      Weight Dosing             45 kg                             Weight Measured and Calculated in Lbs     99.21 lb  .   Basic Oxygen Information   12/25/2017 6:50 CST      SpO2                      100 %                             Oxygen Therapy            Room air    12/25/2017 6:07 CST      SpO2                      98 %                             Oxygen Therapy            Room air    12/25/2017 5:52 CST      SpO2                      99 %                             Oxygen Therapy            Room air    12/24/2017 12:26 CST     SpO2                      99 %    12/24/2017 11:12 CST     SpO2                      100 %                             Oxygen Therapy            Room air    12/24/2017 9:38 CST      SpO2                      100 %                             Oxygen Therapy            Room air  .   General:  Alert, no acute distress.    [place] coma scale:  Eye response: 4 /4, verbal response: 5 /5, motor response: 6 /6, Total score: Total score: 15.    Neurological:  Alert and oriented to person, place, time, and situation, No focal neurological deficit observed, CN II-XII intact, normal sensory observed, normal  motor observed, normal speech observed.    Skin:  Warm, dry, intact, no pallor, no rash, normal for ethnicity, Contusion to the posterior scalp with focal tenderness.    Head:  Normocephalic, small hematoma noted to the left posterior scalp; no other evidence of trauma noted.  Palpation of the scalp hematoma reproduces the patient's symptoms of head pain.    Neck:  Supple, trachea midline, no tenderness.    Eye:  Pupils are equal, round and reactive to light, extraocular movements are intact, normal conjunctiva.    Ears, nose, mouth and throat:  Tympanic membranes clear, no pharyngeal erythema or exudate, Mouth: Dry mucous membranes.    Cardiovascular:  Regular rate and rhythm, Normal peripheral perfusion, No edema.    Respiratory:  Lungs are clear to auscultation, respirations are non-labored, breath sounds are equal, Symmetrical chest wall expansion.    Chest wall:  No tenderness, No deformity.    Back:  Nontender, Normal range of motion, Normal alignment, no step-offs.    Musculoskeletal:  Normal ROM, normal strength, No calf edema, asymmetry, erythema, warmth, tenderness to palpation or palpable cords appreciated bilaterally.    Gastrointestinal:  Soft, Nontender, Non distended, Normal bowel sounds.    Genitourinary:  No tenderness.   Psychiatric:  Cooperative.      Medical Decision Making   Differential Diagnosis:  Syncope, dysrhythmia, anemia, gastrointestinal bleed, dehydration, orthostatic hypotension, vasovagal episode, Seizure, electrolyte abnormality, and others.    Rationale:  67-year-old female diagnosed with influenza yesterday after being evaluated for a syncopal episode presents for a syncopal episode with a 2nd episode of blunt head trauma.  She is afebrile, hemodynamically stable at this time without evidence of DVT.  She is neurologically intact.  She only complains of posterior head pain at the site of the hematoma, however considering multiple episodes of head trauma, CT of the head will be  obtained.  I will repeat her labs for comparison to yesterday.  Upon review of the medical chart, patient had an elevated d-dimer yesterday as well.  Again, no evidence of DVT, however considering this with multiple syncopal episodes, CT of the chest will be obtained to assess for PE.  IV fluids have been initiated.  Reassess.   Documents reviewed:  Emergency department nurses' notes.   Orders  Launch Order Profile (Selected)   Inpatient Orders  Ordered  Cardiac Monitorin17 6:21:00 CST, Constant Order  Discharge Planning Ongoing Assessment: 17 9:00:00 CST, q3day  Fall Risk Protocol: 17 6:08:21 CST, Constant Order  Pulse Oximetry: 17 6:21:00 CST, Constant order  Saline Lock Insert: 17 6:21:00 CST, Constant order  Ordered (Exam Started)  CT Head W/O Contrast: Stat, 17 6:51:00 CST, Head Injury, None, Stretcher, Rad Type, Schedule this test, [facility], 17 6:51:00 CST  CT Thorax W Contrast: Stat, 17 6:21:00 CST, Chest Pain, None, Stretcher, Rad Type, Schedule this test, [facility], 17 6:21:00 CST  Ordered (In-Lab)  CMP: Stat collect, 17 6:21:00 CST, Blood, Stop date 17 6:22:00 CST, Lab Collect, Print Label By Order Location, 17 6:21:00 CST  Completed  Automated Diff: Now collect, 17 6:30:00 CST, Blood, Collected, Stop date 17 6:30:00 CST, Lab Collect, Print Label By Order Location, 17 6:21:00 CST  CBC w/ Auto Diff: Now collect, 17 6:21:00 CST, Blood, Stop date 17 6:22:00 CST, Lab Collect, Print Label By Order Location, 17 6:21:00 CST  EK17 6:21:00 CST, Stat, Once, 24, 17 6:21:00 CST, -1, -1, 17 6:21:00 CST  Normal Saline (0.9% NS) IV: 1,000 mL, 1,000 mL, IV, 1000 mL/hr, start date 17 6:16:00 CST, stop date 17 6:16:00 CST, STAT  Discontinued  NS (0.9% Sodium Chloride) Infusion 1,000 mL: 1,000 mL, 1,000 mL, IV, 1,000 mL/hr, start date 17 6:22:00 CST.    Electrocardiogram:  Time 12/25/2017 06:09:00, rate 87, normal sinus rhythm, no ectopy, normal [place] & QRS intervals, EP Interp, T wave Inversion (isolated), AVL, , V2, , non specific.    Results review:  Lab results : Lab View   12/25/2017 6:30 CST      Sodium Lvl                135 mmol/L  LOW                             Potassium Lvl             3.9 mmol/L                             Chloride                  101 mmol/L                             CO2                       25.0 mmol/L                             Calcium Lvl               8.2 mg/dL  LOW                             Glucose Lvl               87 mg/dL                             BUN                       18.0 mg/dL                             Creatinine                1.11 mg/dL  HI                             eGFR-AA                   >60 mL/min/1.73 m2  NA                             eGFR-DELORIS                  52 mL/min/1.73 m2  NA                             Bili Total                1.0 mg/dL                             Bili Direct               0.30 mg/dL                             Bili Indirect             0.70 mg/dL                             AST                       48 unit/L  HI                             ALT                       35 unit/L                             Alk Phos                  60 unit/L                             Total Protein             6.7 gm/dL                             Albumin Lvl               3.40 gm/dL                             Globulin                  3.30 gm/dL                             A/G Ratio                 1.0  NA                             WBC                       7.6 x10(3)/mcL                             RBC                       3.42 x10(6)/mcL  LOW                             Hgb                       10.6 gm/dL  LOW                             Hct                       32.6 %  LOW                             Platelet                  173 x10(3)/mcL                             MCV                        95.3 fL  HI                             MCH                       31.0 pg                             MCHC                      32.5 gm/dL  LOW                             RDW                       13.6 %                             MPV                       10.5 fL                             Abs Neut                  5.12 x10(3)/mcL                             Neutro Auto               68 %  NA                             Lymph Auto                26 %  NA                             Mono Auto                 4 %  NA                             Eos Auto                  1 %  NA                             Abs Eos                   0.1 x10(3)/mcL                             Basophil Auto             0 %  NA                             Abs Neutro                5.12 x10(3)/mcL                             Abs Lymph                 2.0 x10(3)/mcL                             Abs Mono                  0.3 x10(3)/mcL                             Abs Baso                  0.0 x10(3)/mcL    12/24/2017 10:00 CST     UA Appear                 Clear                             UA Color                  YELLOW                             UA Spec Grav              1.011                             UA Bili                   Negative                             UA pH                     6.5                             UA Urobilinogen           2 mg/dL                             UA Blood                  0.03 mg/dL                             UA Glucose                Normal                             UA Ketones                20 mg/dL                             UA Protein                50 mg/dL                             UA Nitrite                Negative                             UA Leuk Est               Negative                             UA WBC Interp             0-2 /HPF                             UA RBC Interp             6-10                             UA Bact Interp            None Seen                              UA Squam Epi Interp                                    UA Hyal Cast Interp       3-5    12/24/2017 9:47 CST      POC Troponin              0.01 ng/mL    12/24/2017 9:45 CST      Influ A Ag                Positive                             Influ B Ag                Negative                             Influ A/B Ag              N/A    12/24/2017 9:40 CST      Sodium Lvl                135 mmol/L  LOW                             Potassium Lvl             3.4 mmol/L  LOW                             Chloride                  101 mmol/L                             CO2                       26 mmol/L                             Calcium Lvl               8.7 mg/dL                             Glucose Lvl               96 mg/dL                             BUN                       13 mg/dL                             Creatinine                1.10 mg/dL                             eGFR-AA                   64 mL/min  LOW                             eGFR-DELORIS                  53 mL/min  LOW                             Bili Total                0.7 mg/dL                             Bili Direct               0.2 mg/dL                             Bili Indirect             0.5 mg/dL                             AST                       36 unit/L                             ALT                       34 unit/L                             Alk Phos                  68 unit/L                             Total Protein             7.5 gm/dL                             Albumin Lvl               3.8 gm/dL                             Globulin                  3.70 gm/mL  HI                             A/G Ratio                 1 ratio                             Total CK                  132 unit/L                             CK MB                     <1.0 ng/mL  LOW                             PT                        14.3 second(s)                             INR                       1.13                             PTT                        43.1 second(s)  HI                             D-Dimer                   1.79 mcg/ml FEU  HI                             WBC                       7.7 x10(3)/mcL                             RBC                       3.54 x10(6)/mcL  LOW                             Hgb                       11.3 gm/dL  LOW                             Hct                       32.7 %  LOW                             Platelet                  184 x10(3)/mcL                             MCV                       92.4 fL                             MCH                       31.9 pg                             MCHC                      34.6 gm/dL                             RDW                       13.4 %                             MPV                       10.6 fL  HI                             Abs Neut                  5.36 x10(3)/mcL                             Neutro Auto               70 x10(3)/mcL  NA                             Lymph Auto                24 %                             Mono Auto                 5 %                             Eos Auto                  0 %                             Abs Eos                   0.01 x10(3)/mcL  NA                             Basophil Auto             0 %                             Abs Neutro                5.36 x10(3)/mcL  NA                             Abs Lymph                 1.86 x10(3)/mcL  NA                             Abs Mono                  0.38 x10(3)/mcL  NA                             Abs Baso                  0.04 x10(3)/mcL  NA                             IG%                       0 %  NA                             IG#                       0.0200  NA  .   Head Computed Tomography:  Time reported 12/25/2017 07:19:00, interpretation by Radiologist, CT of the brain without contrast     there is an acute subdural hematoma along the anterior falx cerebri, measuring up to 5 mm in thickness.  No other intracranial hemorrhage is seen.  There is no  definite sign of acute or old infarction.  No definite mass lesion is seen on this noncontrast study.  There is no midline shift or other form of herniation.  No hydrocephalus is noted.    Impression  Subdural hematoma along the anterior falx cerebri     Electronically signed on Dec 25, 2017 7:21:56 AM CST by:  Leighton Truong MD  Diplomate, [employer] Board of Radiology    This report was discussed with Vicky Martin on Dec 25, 2017 07:19:00 CST.  Addendum electronically signed by Leighton Truong MD on Dec 25, 2017 07:21:56 CST    This report was discussed with Vicky Martin on Dec 25, 2017 07:19:00 CST.   .    Chest CT:  Date/ time reported 12/25/2017 07:34:00, interpretation by Radiologist, CTA of the chest     There is no definite sign of pulmonary embolism     There is severe emphysema.  There is no consolidation, pleural effusion, or pneumothorax     There is no sign of thoracic aortic aneurysm or dissection.  No adenopathy is seen.  There is no pericardial effusion.  There is mild scoliosis.  No definite fracture is seen    Impression  No definite sign of pulmonary embolism or other acute pathology     Electronically signed on Dec 25, 2017 7:34:29 AM CST by:  Leighton Truong MD  Diplomate, American Board of Radiology.       Reexamination/ Reevaluation   Time: 12/25/2017 08:00:00 .   Vital signs   Basic Oxygen Information   12/25/2017 6:50 CST      SpO2                      100 %                             Oxygen Therapy            Room air    12/25/2017 6:07 CST      SpO2                      98 %                             Oxygen Therapy            Room air    12/25/2017 5:52 CST      SpO2                      99 %                             Oxygen Therapy            Room air    12/24/2017 12:26 CST     SpO2                      99 %    12/24/2017 11:12 CST     SpO2                      100 %                             Oxygen Therapy            Room air    12/24/2017 9:38 CST      SpO2                       100 %                             Oxygen Therapy            Room air     Course: unchanged.   Assessment: exam unchanged.   Notes: Patient remains stable and neurologically intact.  CT of the chest is negative for acute findings, CT of the head shows subdural hematoma of the anterior falx cerebri at 5 mm thickness without shift.  Neurosurgery has been consulted and cleared the patient from a surgical standpoint but will evaluate the patient in the hospital as she will be admitted for further evaluation of syncope. Hospitalist has been consulted. Patient is amenable to admission.      Impression and Plan   Diagnosis   Influenza A ([id number] J10.1)   Syncope/Near syncope (PNED [id number])   Subdural hematoma      Calls-Consults   -  Lucy PEÑA, Jeff [place], paged Dr. Ayala at [phone], [place] for medicine admit.    -  12/25/2017 08:40:00 , Trauma surgery, [place] for medicine admit.    -  12/25/2017 09:15:00 , Hospital medicine.    Plan   Condition: Stable.    Disposition: Admit time  12/25/2017 08:30:00, Place in Observation Telemetry Unit, Soni Mcgowan MD.    Counseled: Patient, Family, Regarding diagnosis, Regarding diagnostic results, Regarding treatment plan, Patient indicated understanding of instructions.    Notes: I, Kristen Marquez, acted solely as a scribe for and in the presence of Dr. Martin who performed the service. , I, Dr. Vicky Martin, personally evaluated and examined this patient and agree with the documentation as above. .

## 2022-04-30 NOTE — H&P
Patient:   Melina Lloyd             MRN: [id number]            FIN: [id number]               Age:   67 years     Sex:  Female     :  1950   Associated Diagnoses:   None   Author:   Soni Mcgowan MD      Chief Complaint   Syncopal episode and fall      History of Present Illness   67-year-old -American female presented to the ER today after she had a syncopal episode and she fell.  Patient's son was in the room who was with her at that time.  Patient reports that she was making coffee and all of a sudden he she blacked out and she fell on the ground she did not lose consciousness was alert awake and oriented and was answering questions all the time even when she was on the floor, her son called ambulance and she was brought into the ER.  Patient had a similar episode yesterday when she fell but at that time she lost her consciousness and she was confused and was repeating the same words again and again she got better after some time.  She went to the hospital where she was diagnosed with flu and was sent home.  Since patient had 1 more episode this morning so her son brought her into the ER.  CT of the head was done in the ER that showed acute anterior interhemispheric fissure hemorrhage.  Neurosurgery was consulted and they recommended no neurosurgical intervention at this time.  Patient has been admitted to hospitalist service for further management and care         Review of Systems   As documented all other systems reviewed and negative      Health Status   Allergies:    Allergic Reactions (All)  Severity Not Documented  AmLODIPine- Itch.  Norvasc- Itching.  PredniSONE- Itching.  Canceled/Inactive Reactions (All)  No Known Medication Allergies,    Allergies (3) Active Reaction  amLODIPine ITCH  Norvasc ITCHING  predniSONE ITCHING     Current medications:  (Selected)   Inpatient Medications  Ordered  Tamiflu 30 mg oral capsule: 30 mg, form: Cap, Oral, BID, first dose 17 21:00:00  CST, 24,034  acetaminophen: 650 mg, form: Tab, Oral, q6hr PRN for fever, first dose 12/25/17 10:56:00 CST, > 38.1 degrees Celsius (100.6 degrees Fahrenheit), 30,022  atorvastatin: 80 mg, form: Tab, Oral, Daily, first dose 12/25/17 17:00:00 CST, 66,027  codeine-guaifenesin 10 mg-100 mg/5 mL oral syrup: 5 mL, form: Syrup, Oral, q4hr PRN for cough, first dose 12/25/17 15:56:00 CST  ergocalciferol: 50,000 IntUnit, form: Cap, Oral, qWeek, first dose 12/25/17 16:00:00 CST, 110  ondansetron 2 mg/mL injectable solution: 4 mg, form: Injection, IV Push, q4hr PRN for nausea, first dose 12/25/17 10:56:00 CST, 26,051  Prescriptions  Prescribed  Pepcid 20 mg oral tablet: 20 mg = 1 tab(s), Oral, Daily, # 14 tab(s), 0 Refill(s)  Tamiflu 75 mg oral capsule: 75 mg = 1 cap(s), Oral, BID, X 5 day(s), # 10 cap(s), 0 Refill(s)  Vitamin D 50,000 intl units oral capsule: 50,000 IntUnit = 1 cap(s), Oral, qWeek, # 8 cap(s), 1 Refill(s), Pharmacy: [facility]  albuterol CFC free 90 mcg/inh inhalation aerosol with adapter: 2 puff(s), INH, QID, PRN PRN for wheezing, # 1 EA, 0 Refill(s)  codeine-guaifenesin 10 mg-100 mg/5 mL oral syrup: 5 mL, Oral, q4hr, PRN PRN as needed for cough, # 120 mL, 0 Refill(s)  lisinopril 20 mg oral tablet: 20 mg = 1 tab(s), Oral, BID, # 180 tab(s), 3 Refill(s), Pharmacy: [facility]  metoprolol tartrate 25 mg oral tab: 25 mg = 1 tab(s), Oral, BID, # 180 tab(s), 3 Refill(s), Pharmacy: [facility]  nitroglycerin 0.4 mg sublingual TAB: 0.4 mg = 1 tab(s), SL, q5min, PRN PRN for chest pain, If chest pain not relieved in 5 minutes after first dose, seek immediate medical attention, # 100 tab(s), 3 Refill(s)  Documented Medications  Documented  Pepcid 20 mg oral tablet: 20 mg = 1 tab(s), Oral, Once-NOW, 0 Refill(s)  Solumedrol IV push / IM: 125 mg = 2 mL, IM, Once-NOW, 0 Refill(s)  aspirin 81 mg oral tablet: 81 mg = 1 tab(s), Oral, Daily, # 90 tab(s), 0 Refill(s)  atorvastatin 80 mg oral  tablet: 80 mg = 1 tab(s), Oral, Daily, # 30 tab(s), 0 Refill(s)  hydroCHLOROthiazide 12.5 mg oral capsule: 12.5 mg = 1 cap(s), Oral, Daily, # 90 cap(s), 0 Refill(s),    Medications (6) Active  Scheduled: (3)  atorvastatin 40 mg Tab  80 mg 2 tab(s), Oral, Daily  ergocalciferol  50,000 IntUnit, Oral, qWeek  oseltamivir 30mg Cap  30 mg 1 cap(s), Oral, BID  Continuous: (0)  PRN: (3)  acetaminophen 325 mg Tab  650 mg 2 tab(s), Oral, q6hr  cod./guaifen.(GuiatussAC) 10-100mg/5 ml UD Syr  5 mL, Oral, q4hr  ondansetron 2 mg/mL inj - 2mL  4 mg 2 mL, IV Push, q4hr        Histories   Past Medical History: Hypertension, hyperlipidemia   Family History: Mom has hypertension, brother has hyperlipidemia   Procedure history: 2 C-sections and hysterectomy   Social History     No alcohol no drugs, smokes one pack per day.        Physical Examination      Vital Signs (last 24 hrs)_____  Last Charted___________  Temp Oral     37.5 DegC  (DEC 25 15:47)  Heart Rate Peripheral   H 101bpm  (DEC 25 15:47)  Resp Rate         18 br/min  (DEC 25 15:47)  SBP      117 mmHg  (DEC 25 15:47)  DBP      68 mmHg  (DEC 25 15:47)  SpO2      98 %  (DEC 25 15:47)     General:  Alert and oriented, No acute distress.    Eye:  Pupils are equal, round and reactive to light, Extraocular movements are intact, Normal conjunctiva.    HENT:  Normocephalic.    Neck:  Supple, Non-tender, No carotid bruit.    Respiratory:  Lungs are clear to auscultation, Respirations are non-labored.    Cardiovascular:  Normal rate, Regular rhythm.    Gastrointestinal:  Soft, Non-tender, Non-distended.    Musculoskeletal:  Normal range of motion, Normal strength.    Integumentary:  Warm, Dry, Pink.    Neurologic:  Alert, Oriented, Normal sensory, Normal motor function.    Cognition and Speech:  Oriented, Speech clear and coherent.    Psychiatric:  Cooperative, Appropriate mood & affect.       Review / Management   Results review:     Labs (Last four charted values)  Glucose               87 (DEC 25)   PT                   14.0 (DEC 25)   INR                  1.05 (DEC 25)   PTT                  35.5 (DEC 25) , All Results   12/25/2017 6:30 CST      WBC                       7.6 x10(3)/mcL                             RBC                       3.42 x10(6)/mcL  LOW                             Hgb                       10.6 gm/dL  LOW                             Hct                       32.6 %  LOW                             Platelet                  173 x10(3)/mcL                             MCV                       95.3 fL  HI                             MCH                       31.0 pg                             MCHC                      32.5 gm/dL  LOW                             RDW                       13.6 %                             MPV                       10.5 fL                             Abs Neut                  5.12 x10(3)/mcL                             Neutro Auto               68 %  NA                             Lymph Auto                26 %  NA                             Mono Auto                 4 %  NA                             Eos Auto                  1 %  NA                             Abs Eos                   0.1 x10(3)/mcL                             Basophil Auto             0 %  NA                             Abs Neutro                5.12 x10(3)/mcL                             Abs Lymph                 2.0 x10(3)/mcL                             Abs Mono                  0.3 x10(3)/mcL                             Abs Baso                  0.0 x10(3)/mcL                             Sodium Lvl                135 mmol/L  LOW                             Potassium Lvl             3.9 mmol/L                             Chloride                  101 mmol/L                             CO2                       25.0 mmol/L                             Calcium Lvl               8.2 mg/dL  LOW                             Glucose Lvl               87 mg/dL                              BUN                       18.0 mg/dL                             Creatinine                1.11 mg/dL  HI                             eGFR-AA                   >60 mL/min/1.73 m2  NA                             eGFR-DELORIS                  52 mL/min/1.73 m2  NA                             Bili Total                1.0 mg/dL                             Bili Direct               0.30 mg/dL                             Bili Indirect             0.70 mg/dL                             AST                       48 unit/L  HI                             ALT                       35 unit/L                             Alk Phos                  60 unit/L                             Total Protein             6.7 gm/dL                             Albumin Lvl               3.40 gm/dL                             Globulin                  3.30 gm/dL                             A/G Ratio                 1.0  NA  .    Radiology results   Rad Results (ST)   Accession: [id number]  Order: CT Thorax W Contrast  Report Dt/Tm: 12/25/2017 09:05  Report:   CT PULMONARY ANGIOGRAM WITH CONTRAST:     REASON FOR STUDY: Chest Pain     Technique: Study was performed with IV contrast using PE protocol.  Reformatted MIP images were obtained for pulmonary angiography.      COMPARISON: None.     Total DLP unknown mGy*cm      FINDINGS:     There are no pulmonary emboli. There is no aortic dissection or  aneurysm. There is no pleural effusion or pericardial effusion. There  is minimal subsegmental atelectasis or scarring at the right lung  base. Lungs are otherwise clear.     Impression:     No pulmonary emboli.      Accession: [id number]  Order: CT Head W/O Contrast  Report Dt/Tm: 12/25/2017 09:03  Report:   CT Head W/O Contrast     REASON FOR EXAM:  Head Injury     Total  mGy*cm     COMPARISON: None.     FINDINGS:     There is a small anterior interhemispheric fissure acute hematoma  measuring up to 4 mm in depth. There is no mass  effect or other area  of intracranial hemorrhage. Ventricles and subarachnoid spaces are  within normal limits. No skull fracture seen.     IMPRESSION:     Acute anterior interhemispheric fissure hemorrhage.     Findings were discussed with Vicky Martin by Dr. Truong at 7:21 AM  12/25/2017.         Documentation reviewed:       Case discussed with: Labs reviewed, Images reviewed.  Images reports reviewed.       Impression and Plan   #1 syncopal episode  We'll order orthostatic, 2-D echo and carotid ultrasound  On telemetry    #2 influenza A  We'll continue with Tamiflu    #3 acute anterior interhemispheric fissure hemorrhage  -Neurosurgery signed off  -Fall precautions    #4 hypertension  Currently normotensive  -We will hold off on blood pressure medication as we do not want her to be hypotensive  -We will keep hydralazine 10 IV every 4 hours when necessary    #5 hyperlipidemia  We'll continue with statin      Prophylaxis: SCD    Time spent in admission 71 minutes

## 2022-04-30 NOTE — CONSULTS
Patient:   Melina Lloyd             MRN: [id number]            FIN: [id number]               Age:   67 years     Sex:  Female     :  1950   Associated Diagnoses:   None   Author:   Didier Hernandes MD      Chief Complaint   CIS was consulted for further evaluation of syncope      History of Present Illness   Ms. Lloyd is a 67 year old female with PMH of HTN, Abormal Calcium Score 62, HLD, and EF 70% ( ), known to Dr. Sean Najera, who presented to the ED on 17 after falling from a syncopal episode. Patient stated she was making a coffee, and the all of a sudden, she blacked out and fell to the floor. She denied losing any consciousness. According to patient' son, when she was on the floor, she was alert and oriented the entire time answering questions on the floor.  Patient's son called the ambulance and patient was brought the ED. CT of head showed acute interhemispheric fissure hemorrhage. NS was consutled that time and recommened no surgical interventions needed at that time. CIS was consulted for further evaluation of syncope.     Apparently, patient had had a similar episode the day before; during this episdode, the son said she did lose consciousness and was repeating her words over and over again. Patient was brought to the hosptial after passing out the first time, was diagnosed with the flu, and was sent home.     17: Patient in NAD. Denies CP, SoB, and palps. Patient is SR. Patient denied ever being told she had a slow heartrate or hx of margaret cardia. Patient also denied any weakness or dizziness prior to syncope episdoes. Paient stated these were the first syncope episodes she ever had.     PMH: HTN, Abormal Calcium Score 62, HLD, and EF 65%( ),  PSH: HYsty,   PFH: Mother-HTN  Social Hx: current everyday smoker, denies ETOH and illicit drug use    Echo 17:  EF 65%  mild TR with RVSp 38    Cartotid ultrasound 17:  less than 50% RCA  stenosis  Left ICA no stenosis noted      Normal stressEKG (2.24.17)         Review of Systems   Constitutional:  Negative except as documented in history of present illness.    Eye:  Negative except as documented in history of present illness.    Ear/Nose/Mouth/Throat:  Negative except as documented in history of present illness.    Respiratory:  No shortness of breath.    Cardiovascular:  No chest pain, No palpitations.    Gastrointestinal:  Negative except as documented in history of present illness.    Genitourinary:  Negative except as documented in history of present illness.    Hematology/Lymphatics:  Negative except as documented in history of present illness.    Endocrine:  Negative except as documented in history of present illness.    Immunologic:  Negative except as documented in history of present illness.    Musculoskeletal:  Negative except as documented in history of present illness.    Integumentary:  Negative except as documented in history of present illness.    Neurologic:  Negative except as documented in history of present illness.    Psychiatric:  Negative except as documented in history of present illness.    All other systems are negative      Health Status   Allergies:    Allergic Reactions (Selected)  Severity Not Documented  AmLODIPine- Itch.  Norvasc- Itching.  PredniSONE- Itching.,    Allergies (3) Active Reaction  amLODIPine ITCH  Norvasc ITCHING  predniSONE ITCHING     Current medications:  (Selected)   Inpatient Medications  Ordered  NS 1,000 mL: 1,000 mL, 1,000 mL, IV, 75 mL/hr, start date 12/25/17 17:20:00 CST  Tamiflu 30 mg oral capsule: 30 mg, form: Cap, Oral, BID, first dose 12/25/17 21:00:00 CST, 24,034  acetaminophen: 650 mg, form: Tab, Oral, q6hr PRN for fever, first dose 12/25/17 10:56:00 CST, > 38.1 degrees Celsius (100.6 degrees Fahrenheit), 30,022  atorvastatin: 80 mg, form: Tab, Oral, Daily, first dose 12/25/17 17:00:00 CST, 66,027  codeine-guaifenesin 10 mg-100 mg/5 mL  oral syrup: 5 mL, form: Syrup, Oral, q4hr PRN for cough, first dose 12/25/17 15:56:00 CST  ergocalciferol: 50,000 IntUnit, form: Cap, Oral, qWeek, first dose 12/25/17 16:00:00 CST, 110  ondansetron 2 mg/mL injectable solution: 4 mg, form: Injection, IV Push, q4hr PRN for nausea, first dose 12/25/17 10:56:00 CST, 26,051  Prescriptions  Prescribed  Pepcid 20 mg oral tablet: 20 mg = 1 tab(s), Oral, Daily, # 14 tab(s), 0 Refill(s)  Tamiflu 75 mg oral capsule: 75 mg = 1 cap(s), Oral, BID, X 5 day(s), # 10 cap(s), 0 Refill(s)  Vitamin D 50,000 intl units oral capsule: 50,000 IntUnit = 1 cap(s), Oral, qWeek, # 8 cap(s), 1 Refill(s), Pharmacy: [facility]  albuterol CFC free 90 mcg/inh inhalation aerosol with adapter: 2 puff(s), INH, QID, PRN PRN for wheezing, # 1 EA, 0 Refill(s)  codeine-guaifenesin 10 mg-100 mg/5 mL oral syrup: 5 mL, Oral, q4hr, PRN PRN as needed for cough, # 120 mL, 0 Refill(s)  lisinopril 20 mg oral tablet: 20 mg = 1 tab(s), Oral, BID, # 180 tab(s), 3 Refill(s), Pharmacy: [facility]  metoprolol tartrate 25 mg oral tab: 25 mg = 1 tab(s), Oral, BID, # 180 tab(s), 3 Refill(s), Pharmacy: [facility]  nitroglycerin 0.4 mg sublingual TAB: 0.4 mg = 1 tab(s), SL, q5min, PRN PRN for chest pain, If chest pain not relieved in 5 minutes after first dose, seek immediate medical attention, # 100 tab(s), 3 Refill(s)  Documented Medications  Documented  Pepcid 20 mg oral tablet: 20 mg = 1 tab(s), Oral, Once-NOW, 0 Refill(s)  Solumedrol IV push / IM: 125 mg = 2 mL, IM, Once-NOW, 0 Refill(s)  aspirin 81 mg oral tablet: 81 mg = 1 tab(s), Oral, Daily, # 90 tab(s), 0 Refill(s)  atorvastatin 80 mg oral tablet: 80 mg = 1 tab(s), Oral, Daily, # 30 tab(s), 0 Refill(s)  hydroCHLOROthiazide 12.5 mg oral capsule: 12.5 mg = 1 cap(s), Oral, Daily, # 90 cap(s), 0 Refill(s)      Physical Examination   General:  Alert and oriented, No acute distress.    Eye:  Pupils are equal, round and reactive to light,  Extraocular movements are intact.    HENT:  Normocephalic.    Neck:  Supple.    Respiratory:  Lungs are clear to auscultation, Respirations are non-labored, Breath sounds are equal, Symmetrical chest wall expansion.    Cardiovascular:  Normal rate, Regular rhythm, Good pulses equal in all extremities.    Gastrointestinal:  Soft, Non-tender.       Vital Signs (last 24 hrs)_____  Last Charted___________  Temp Oral     36.8 DegC  (DEC 26 04:27)  Heart Rate Peripheral   H 104bpm  (DEC 26 04:30)  Resp Rate         18 br/min  (DEC 26 00:00)  SBP      111 mmHg  (DEC 26 04:30)  DBP      67 mmHg  (DEC 26 04:30)  SpO2      95 %  (DEC 26 04:30)     Musculoskeletal:  Normal range of motion.    Integumentary:  Warm, Dry.    Neurologic:  Alert, Oriented.    Psychiatric:  Cooperative.       Review / Management   Laboratory Results   Today's Lab Results : PowerNote Discrete Results   12/26/2017 7:50 CST      WBC                       5.0 x10(3)/mcL                             RBC                       2.99 x10(6)/mcL  LOW                             Hgb                       9.2 gm/dL  LOW                             Hct                       28.8 %  LOW                             Platelet                  160 x10(3)/mcL                             MCV                       96.3 fL  HI                             MCH                       30.8 pg                             MCHC                      31.9 gm/dL  LOW                             RDW                       13.5 %                             MPV                       10.2 fL                             Abs Neut                  2.92 x10(3)/mcL                             Neutro Auto               59 %  NA                             Lymph Auto                32 %  NA                             Mono Auto                 5 %  NA                             Eos Auto                  3 %  NA                             Abs Eos                   0.2 x10(3)/mcL                              Basophil Auto             0 %  NA                             Abs Neutro                2.92 x10(3)/mcL                             Abs Lymph                 1.6 x10(3)/mcL                             Abs Mono                  0.3 x10(3)/mcL                             Abs Baso                  0.0 x10(3)/mcL                             Sodium Lvl                137 mmol/L                             Potassium Lvl             3.8 mmol/L                             Chloride                  104 mmol/L                             CO2                       24.0 mmol/L                             Calcium Lvl               7.8 mg/dL  LOW                             Glucose Lvl               74 mg/dL                             BUN                       11.0 mg/dL                             Creatinine                0.76 mg/dL                             eGFR-AA                   >60 mL/min/1.73 m2  NA                             eGFR-DELORIS                  >60 mL/min/1.73 m2  NA        Condition:  Stable.       Impression and Plan   IMRPESSION:  Syncope w/ and w/out loss of consciousness  Influenza  HTN  Mild RT ZAHIDA-less than 50%  Normal EF 65 %  Abnoramal Calcium score-62  acute interhemispheric fissure hemorrhage.   -NS on case   -no need for surgery at this time per NS  Current everyday smoker  Hx Normal ETT stress test        PLAN:  No evidence of any cardiac abnormalities on echo and telemetry    Troponin times one negative     Event was most likely related to dehydration in the setting of influenza    Halter monitor oupatient    keep scheduled appt with Dr De Jesus on 12.29.17    Hold all BP meds for now until appt with Dr. De Jesus

## 2022-04-30 NOTE — DISCHARGE SUMMARY
Patient:   Melina Lloyd             MRN: [id number]            FIN: [id number]               Age:   67 years     Sex:  Female     :  1950   Associated Diagnoses:   None   Author:   Makenzie PEÑA, Franc LONG      Discharge Information      Discharge Summary Information   ADMIT/DISCHARGE DATE   Admit Date: 2017  Discharge Date: 2017     Procedures   No procedures recorded for this visit.      Hospital Course   Admission diagnoses/discharge diagnoses    Recurrent syncope  Acute anterior interhemispheric fissure hemorrhage  Acute influenza  Essential hypertension  Pure hyperlipidemia      HPI: This is a 67-year-old -American woman who presented with syncope and a fall.  Patient had a similar episode 1 day prior to presentation.  [facility] and was [facility].  Because of the repetition of the syncope, she came again.  CAT scan of the head showed acute anterior interhemispheric fissure hemorrhage.  Neurosurgery were consulted who recommended no invasive intervention.  She was subsequently admitted for this.  Carotid ultrasound was negative.  2D echo was negative neurosurgery recommended to hold off on aspirin for 2 weeks.  She will follow-up with neurosurgery in 3 weeks with a follow-up CAT scan of her brain.  Cardiology were consulted who recommended to withhold all her antihypertensives until she follows up with cardiology on 2017.  1 of the medications does include a beta-blocker which could be responsible for her recurrent syncope.  The pt will be discharged today.               Physical Examination      Vital Signs (last 24 hrs)_____  Last Charted___________  Temp Oral     37.4 DegC  (DEC 27 08:18)  Heart Rate Peripheral   86 bpm  (DEC 27 08:18)  Resp Rate         18 br/min  (DEC 27 04:59)  SBP      102 mmHg  (DEC 27 08:18)  DBP      68 mmHg  (DEC 27 08:18)  SpO2      100 %  (DEC 27 08:18)     General:  Alert and  oriented, No acute distress.    Neck:  Supple, Non-tender.    Respiratory:  Lungs are clear to auscultation, Respirations are non-labored.    Cardiovascular:  Normal rate, Regular rhythm.    Gastrointestinal:  Soft, Non-tender.       Discharge Plan   Discharge Summary Plan   Discharge disposition: discharge to home.     Orders     Orders   Patient Care:  Give all scheduled vaccinations prior to discharge. (Order): 12/27/2017 8:58 CST, Give all scheduled vaccinations prior to discharge.  Discontinue IV (Order): 12/27/2017 8:58 CST  Pharmacy:  nitroglycerin 0.4 mg sublingual TAB (Discontinue): 12/27/2017 8:58 CST  Solumedrol IV push / IM (Discontinue): 12/27/2017 8:58 CST  Pepcid 20 mg oral tablet (Discontinue): 12/27/2017 8:58 CST  Pepcid 20 mg oral tablet (Discontinue): 12/27/2017 8:58 CST  metoprolol tartrate 25 mg oral tab (Discontinue): 12/27/2017 8:58 CST  lisinopril 20 mg oral tablet (Discontinue): 12/27/2017 8:58 CST  hydroCHLOROthiazide 12.5 mg oral capsule (Discontinue): 12/27/2017 8:58 CST  aspirin 81 mg oral tablet (Discontinue): 12/27/2017 8:58 CST  albuterol CFC free 90 mcg/inh inhalation aerosol with adapter (Discontinue): 12/27/2017 8:58 CST  Admit/Transfer/Discharge:  Discharge Activity (Order): Exercise as Tolerated  Discharge (Order): Home, Give all scheduled vaccinations prior to discharge..        Education and Follow-up   Counseled: patient, family, regarding diagnosis, regarding treatment, regarding medications.     Discharge Planning: Follow up with primary care provider Within 1 week; Keep scheduled appointment  12/29/2017 with cardiology; Jeff Ayala MD In 3 weeks, time spent on discharge disposition included the following: final examination of the patient; discussion of the hospital stay; instructions for continuing care; final diagnoses; patient/family counseling; preparation of discharge records; preparation of prescriptions; referral forms; chart review.  Total time spent on discharge  disposition was 34 minutes.   .

## 2022-05-04 ENCOUNTER — HOSPITAL ENCOUNTER (OUTPATIENT)
Dept: RADIOLOGY | Facility: HOSPITAL | Age: 72
Discharge: HOME | End: 2022-05-04
Attending: INTERNAL MEDICINE
Payer: MEDICARE

## 2022-05-04 DIAGNOSIS — Z12.31 BREAST CANCER SCREENING BY MAMMOGRAM: ICD-10-CM

## 2022-05-04 PROCEDURE — 77081 PR  DEXA,BONE DENSITY, 1 + SITE, APPENDICULR SKELTN: ICD-10-PCS | Mod: 26,,, | Performed by: RADIOLOGY

## 2022-05-04 PROCEDURE — 77080 DXA BONE DENSITY AXIAL: CPT | Mod: 26,,, | Performed by: STUDENT IN AN ORGANIZED HEALTH CARE EDUCATION/TRAINING PROGRAM

## 2022-05-04 PROCEDURE — 77063 MAMMO DIGITAL SCREENING BILAT WITH TOMO: ICD-10-PCS | Mod: 26,,, | Performed by: RADIOLOGY

## 2022-05-04 PROCEDURE — 77080 DEXA BONE DENSITY SPINE HIP: ICD-10-PCS | Mod: 26,,, | Performed by: STUDENT IN AN ORGANIZED HEALTH CARE EDUCATION/TRAINING PROGRAM

## 2022-05-04 PROCEDURE — 77081 DXA BONE DENSITY APPENDICULR: CPT | Mod: 26,,, | Performed by: RADIOLOGY

## 2022-05-04 PROCEDURE — 77080 DXA BONE DENSITY AXIAL: CPT | Mod: TC

## 2022-05-04 PROCEDURE — 77067 MAMMO DIGITAL SCREENING BILAT WITH TOMO: ICD-10-PCS | Mod: 26,,, | Performed by: RADIOLOGY

## 2022-05-04 PROCEDURE — 77067 SCR MAMMO BI INCL CAD: CPT | Mod: 26,,, | Performed by: RADIOLOGY

## 2022-05-04 PROCEDURE — 77067 SCR MAMMO BI INCL CAD: CPT | Mod: TC

## 2022-05-04 PROCEDURE — 77063 BREAST TOMOSYNTHESIS BI: CPT | Mod: 26,,, | Performed by: RADIOLOGY

## 2022-05-06 ENCOUNTER — TELEPHONE (OUTPATIENT)
Dept: INTERNAL MEDICINE | Facility: CLINIC | Age: 72
End: 2022-05-06
Payer: MEDICARE

## 2022-05-06 NOTE — TELEPHONE ENCOUNTER
Bone density reviewed osteoporosis continues with slight improvement from -3.2 to-3.0.  Will offer the patient an option of beginning Fosamax treatment and the DEXA in 2 years.

## 2022-05-09 DIAGNOSIS — I10 PRIMARY HYPERTENSION: Primary | ICD-10-CM

## 2022-05-09 DIAGNOSIS — E78.2 MIXED HYPERLIPIDEMIA: ICD-10-CM

## 2022-05-09 RX ORDER — ATORVASTATIN CALCIUM 40 MG/1
40 TABLET, FILM COATED ORAL DAILY
COMMUNITY
Start: 2022-01-27 | End: 2022-05-09 | Stop reason: SDUPTHER

## 2022-05-09 RX ORDER — METOPROLOL SUCCINATE 25 MG/1
25 TABLET, EXTENDED RELEASE ORAL DAILY
Qty: 30 TABLET | Refills: 11 | Status: SHIPPED | OUTPATIENT
Start: 2022-05-09 | End: 2023-06-22

## 2022-05-09 RX ORDER — METOPROLOL SUCCINATE 25 MG/1
25 TABLET, EXTENDED RELEASE ORAL DAILY
Qty: 90 TABLET | Refills: 1 | Status: CANCELLED | OUTPATIENT
Start: 2022-05-09 | End: 2022-11-09

## 2022-05-09 RX ORDER — METOPROLOL TARTRATE 25 MG/1
TABLET, FILM COATED ORAL
Qty: 90 TABLET | Refills: 0 | Status: SHIPPED | OUTPATIENT
Start: 2022-05-09

## 2022-05-09 RX ORDER — ATORVASTATIN CALCIUM 40 MG/1
TABLET, FILM COATED ORAL
Qty: 90 TABLET | Refills: 0 | Status: SHIPPED | OUTPATIENT
Start: 2022-05-09

## 2022-05-09 RX ORDER — METOPROLOL TARTRATE 25 MG/1
25 TABLET, FILM COATED ORAL DAILY
COMMUNITY
Start: 2022-01-27 | End: 2022-05-09 | Stop reason: SDUPTHER

## 2022-05-09 RX ORDER — ATORVASTATIN CALCIUM 40 MG/1
40 TABLET, FILM COATED ORAL DAILY
Qty: 90 TABLET | Refills: 3 | Status: SHIPPED | OUTPATIENT
Start: 2022-05-09 | End: 2023-05-09

## 2022-05-09 RX ORDER — ATORVASTATIN CALCIUM 40 MG/1
40 TABLET, FILM COATED ORAL DAILY
Qty: 90 TABLET | Refills: 1 | Status: CANCELLED | OUTPATIENT
Start: 2022-05-09

## 2022-05-09 RX ORDER — ATORVASTATIN CALCIUM 40 MG/1
40 TABLET, FILM COATED ORAL
COMMUNITY
End: 2022-05-09 | Stop reason: SDUPTHER

## 2022-12-20 VITALS
BODY MASS INDEX: 20.15 KG/M2 | SYSTOLIC BLOOD PRESSURE: 122 MMHG | HEIGHT: 60 IN | WEIGHT: 102.63 LBS | DIASTOLIC BLOOD PRESSURE: 60 MMHG | HEART RATE: 88 BPM

## 2023-05-04 ENCOUNTER — TELEPHONE (OUTPATIENT)
Dept: INTERNAL MEDICINE | Facility: CLINIC | Age: 73
End: 2023-05-04
Payer: MEDICARE

## 2023-05-04 DIAGNOSIS — Z12.31 BREAST CANCER SCREENING BY MAMMOGRAM: Primary | ICD-10-CM

## 2023-05-04 NOTE — TELEPHONE ENCOUNTER
----- Message from Kirti Carias sent at 5/4/2023  9:40 AM CDT -----  Regarding: mammogram  Please send mammogram orders to [facility]

## 2023-05-09 ENCOUNTER — HOSPITAL ENCOUNTER (OUTPATIENT)
Dept: RADIOLOGY | Facility: HOSPITAL | Age: 73
Discharge: HOME | End: 2023-05-09
Attending: INTERNAL MEDICINE
Payer: MEDICARE

## 2023-05-09 DIAGNOSIS — Z12.31 BREAST CANCER SCREENING BY MAMMOGRAM: ICD-10-CM

## 2023-05-09 PROCEDURE — 77063 BREAST TOMOSYNTHESIS BI: CPT | Mod: 26,,, | Performed by: RADIOLOGY

## 2023-05-09 PROCEDURE — 77067 SCR MAMMO BI INCL CAD: CPT | Mod: TC

## 2023-05-09 PROCEDURE — 77067 SCR MAMMO BI INCL CAD: CPT | Mod: 26,,, | Performed by: RADIOLOGY

## 2023-05-09 PROCEDURE — 77063 MAMMO DIGITAL SCREENING BILAT WITH TOMO: ICD-10-PCS | Mod: 26,,, | Performed by: RADIOLOGY

## 2023-05-09 PROCEDURE — 77067 MAMMO DIGITAL SCREENING BILAT WITH TOMO: ICD-10-PCS | Mod: 26,,, | Performed by: RADIOLOGY

## 2023-05-25 ENCOUNTER — LAB VISIT (OUTPATIENT)
Dept: LAB | Facility: HOSPITAL | Age: 73
End: 2023-05-25
Attending: INTERNAL MEDICINE
Payer: MEDICARE

## 2023-05-25 DIAGNOSIS — M81.0 SENILE OSTEOPOROSIS: ICD-10-CM

## 2023-05-25 DIAGNOSIS — E55.9 AVITAMINOSIS D: ICD-10-CM

## 2023-05-25 DIAGNOSIS — I10 ESSENTIAL HYPERTENSION, MALIGNANT: ICD-10-CM

## 2023-05-25 DIAGNOSIS — E78.5 HYPERLIPIDEMIA, UNSPECIFIED HYPERLIPIDEMIA TYPE: ICD-10-CM

## 2023-05-25 DIAGNOSIS — Z00.00 ROUTINE GENERAL MEDICAL EXAMINATION AT A HEALTH CARE FACILITY: Primary | ICD-10-CM

## 2023-05-25 LAB
ALBUMIN SERPL-MCNC: 3.8 G/DL (ref 3.4–4.8)
ALBUMIN/GLOB SERPL: 1.4 RATIO (ref 1.1–2)
ALP SERPL-CCNC: 82 UNIT/L (ref 40–150)
ALT SERPL-CCNC: 30 UNIT/L (ref 0–55)
APPEARANCE UR: CLEAR
AST SERPL-CCNC: 21 UNIT/L (ref 5–34)
BACTERIA #/AREA URNS AUTO: NORMAL /HPF
BASOPHILS # BLD AUTO: 0.03 X10(3)/MCL
BASOPHILS NFR BLD AUTO: 0.4 %
BILIRUB UR QL STRIP.AUTO: NEGATIVE MG/DL
BILIRUBIN DIRECT+TOT PNL SERPL-MCNC: 0.4 MG/DL
BUN SERPL-MCNC: 12.9 MG/DL (ref 9.8–20.1)
CALCIUM SERPL-MCNC: 9.3 MG/DL (ref 8.4–10.2)
CHLORIDE SERPL-SCNC: 106 MMOL/L (ref 98–107)
CHOLEST SERPL-MCNC: 131 MG/DL
CHOLEST/HDLC SERPL: 3 {RATIO} (ref 0–5)
CO2 SERPL-SCNC: 29 MMOL/L (ref 23–31)
COLOR UR: YELLOW
CREAT SERPL-MCNC: 0.91 MG/DL (ref 0.55–1.02)
DEPRECATED CALCIDIOL+CALCIFEROL SERPL-MC: 49.9 NG/ML (ref 30–80)
EOSINOPHIL # BLD AUTO: 0.2 X10(3)/MCL (ref 0–0.9)
EOSINOPHIL NFR BLD AUTO: 2.7 %
ERYTHROCYTE [DISTWIDTH] IN BLOOD BY AUTOMATED COUNT: 13.3 % (ref 11.5–17)
GFR SERPLBLD CREATININE-BSD FMLA CKD-EPI: >60 MLS/MIN/1.73/M2
GLOBULIN SER-MCNC: 2.8 GM/DL (ref 2.4–3.5)
GLUCOSE SERPL-MCNC: 81 MG/DL (ref 82–115)
GLUCOSE UR QL STRIP.AUTO: NEGATIVE MG/DL
HCT VFR BLD AUTO: 39.6 % (ref 37–47)
HDLC SERPL-MCNC: 45 MG/DL (ref 35–60)
HGB BLD-MCNC: 12.8 G/DL (ref 12–16)
IMM GRANULOCYTES # BLD AUTO: 0.02 X10(3)/MCL (ref 0–0.04)
IMM GRANULOCYTES NFR BLD AUTO: 0.3 %
KETONES UR QL STRIP.AUTO: NEGATIVE MG/DL
LDLC SERPL CALC-MCNC: 74 MG/DL (ref 50–140)
LEUKOCYTE ESTERASE UR QL STRIP.AUTO: NEGATIVE UNIT/L
LYMPHOCYTES # BLD AUTO: 1.99 X10(3)/MCL (ref 0.6–4.6)
LYMPHOCYTES NFR BLD AUTO: 27.1 %
MCH RBC QN AUTO: 31.7 PG (ref 27–31)
MCHC RBC AUTO-ENTMCNC: 32.3 G/DL (ref 33–36)
MCV RBC AUTO: 98 FL (ref 80–94)
MONOCYTES # BLD AUTO: 0.38 X10(3)/MCL (ref 0.1–1.3)
MONOCYTES NFR BLD AUTO: 5.2 %
NEUTROPHILS # BLD AUTO: 4.73 X10(3)/MCL (ref 2.1–9.2)
NEUTROPHILS NFR BLD AUTO: 64.3 %
NITRITE UR QL STRIP.AUTO: NEGATIVE
NRBC BLD AUTO-RTO: 0 %
PH UR STRIP.AUTO: 6.5 [PH]
PLATELET # BLD AUTO: 235 X10(3)/MCL (ref 130–400)
PMV BLD AUTO: 11 FL (ref 7.4–10.4)
POTASSIUM SERPL-SCNC: 3.8 MMOL/L (ref 3.5–5.1)
PROT SERPL-MCNC: 6.6 GM/DL (ref 5.8–7.6)
PROT UR QL STRIP.AUTO: NEGATIVE MG/DL
RBC # BLD AUTO: 4.04 X10(6)/MCL (ref 4.2–5.4)
RBC #/AREA URNS AUTO: <5 /HPF
RBC UR QL AUTO: NEGATIVE UNIT/L
SODIUM SERPL-SCNC: 141 MMOL/L (ref 136–145)
SP GR UR STRIP.AUTO: 1.01 (ref 1–1.03)
SQUAMOUS #/AREA URNS AUTO: <5 /HPF
TRIGL SERPL-MCNC: 58 MG/DL (ref 37–140)
UROBILINOGEN UR STRIP-ACNC: 0.2 MG/DL
VLDLC SERPL CALC-MCNC: 12 MG/DL
WBC # SPEC AUTO: 7.35 X10(3)/MCL (ref 4.5–11.5)
WBC #/AREA URNS AUTO: <5 /HPF

## 2023-05-25 PROCEDURE — 36415 COLL VENOUS BLD VENIPUNCTURE: CPT

## 2023-05-25 PROCEDURE — 81001 URINALYSIS AUTO W/SCOPE: CPT

## 2023-05-25 PROCEDURE — 80061 LIPID PANEL: CPT

## 2023-05-25 PROCEDURE — 85025 COMPLETE CBC W/AUTO DIFF WBC: CPT

## 2023-05-25 PROCEDURE — 80053 COMPREHEN METABOLIC PANEL: CPT

## 2023-05-25 PROCEDURE — 82306 VITAMIN D 25 HYDROXY: CPT

## 2023-06-22 ENCOUNTER — OFFICE VISIT (OUTPATIENT)
Dept: INTERNAL MEDICINE | Facility: CLINIC | Age: 73
End: 2023-06-22
Payer: MEDICARE

## 2023-06-22 VITALS
BODY MASS INDEX: 21.75 KG/M2 | SYSTOLIC BLOOD PRESSURE: 122 MMHG | DIASTOLIC BLOOD PRESSURE: 68 MMHG | HEIGHT: 60 IN | OXYGEN SATURATION: 99 % | HEART RATE: 89 BPM | WEIGHT: 110.81 LBS

## 2023-06-22 DIAGNOSIS — Z12.2 SCREENING FOR LUNG CANCER: ICD-10-CM

## 2023-06-22 DIAGNOSIS — Z00.00 WELLNESS EXAMINATION: Primary | ICD-10-CM

## 2023-06-22 DIAGNOSIS — E78.2 MIXED HYPERLIPIDEMIA: Chronic | ICD-10-CM

## 2023-06-22 DIAGNOSIS — I10 ESSENTIAL (PRIMARY) HYPERTENSION: Chronic | ICD-10-CM

## 2023-06-22 PROCEDURE — 1160F PR REVIEW ALL MEDS BY PRESCRIBER/CLIN PHARMACIST DOCUMENTED: ICD-10-PCS | Mod: CPTII,,, | Performed by: INTERNAL MEDICINE

## 2023-06-22 PROCEDURE — 3288F FALL RISK ASSESSMENT DOCD: CPT | Mod: CPTII,,, | Performed by: INTERNAL MEDICINE

## 2023-06-22 PROCEDURE — 1160F RVW MEDS BY RX/DR IN RCRD: CPT | Mod: CPTII,,, | Performed by: INTERNAL MEDICINE

## 2023-06-22 PROCEDURE — 3078F PR MOST RECENT DIASTOLIC BLOOD PRESSURE < 80 MM HG: ICD-10-PCS | Mod: CPTII,,, | Performed by: INTERNAL MEDICINE

## 2023-06-22 PROCEDURE — 1159F PR MEDICATION LIST DOCUMENTED IN MEDICAL RECORD: ICD-10-PCS | Mod: CPTII,,, | Performed by: INTERNAL MEDICINE

## 2023-06-22 PROCEDURE — 3074F SYST BP LT 130 MM HG: CPT | Mod: CPTII,,, | Performed by: INTERNAL MEDICINE

## 2023-06-22 PROCEDURE — 1126F AMNT PAIN NOTED NONE PRSNT: CPT | Mod: CPTII,,, | Performed by: INTERNAL MEDICINE

## 2023-06-22 PROCEDURE — 1101F PR PT FALLS ASSESS DOC 0-1 FALLS W/OUT INJ PAST YR: ICD-10-PCS | Mod: CPTII,,, | Performed by: INTERNAL MEDICINE

## 2023-06-22 PROCEDURE — 3008F PR BODY MASS INDEX (BMI) DOCUMENTED: ICD-10-PCS | Mod: CPTII,,, | Performed by: INTERNAL MEDICINE

## 2023-06-22 PROCEDURE — 3074F PR MOST RECENT SYSTOLIC BLOOD PRESSURE < 130 MM HG: ICD-10-PCS | Mod: CPTII,,, | Performed by: INTERNAL MEDICINE

## 2023-06-22 PROCEDURE — 3078F DIAST BP <80 MM HG: CPT | Mod: CPTII,,, | Performed by: INTERNAL MEDICINE

## 2023-06-22 PROCEDURE — G0439 PR MEDICARE ANNUAL WELLNESS SUBSEQUENT VISIT: ICD-10-PCS | Mod: ,,, | Performed by: INTERNAL MEDICINE

## 2023-06-22 PROCEDURE — 1101F PT FALLS ASSESS-DOCD LE1/YR: CPT | Mod: CPTII,,, | Performed by: INTERNAL MEDICINE

## 2023-06-22 PROCEDURE — 1126F PR PAIN SEVERITY QUANTIFIED, NO PAIN PRESENT: ICD-10-PCS | Mod: CPTII,,, | Performed by: INTERNAL MEDICINE

## 2023-06-22 PROCEDURE — 3008F BODY MASS INDEX DOCD: CPT | Mod: CPTII,,, | Performed by: INTERNAL MEDICINE

## 2023-06-22 PROCEDURE — 3288F PR FALLS RISK ASSESSMENT DOCUMENTED: ICD-10-PCS | Mod: CPTII,,, | Performed by: INTERNAL MEDICINE

## 2023-06-22 PROCEDURE — G0439 PPPS, SUBSEQ VISIT: HCPCS | Mod: ,,, | Performed by: INTERNAL MEDICINE

## 2023-06-22 PROCEDURE — 1159F MED LIST DOCD IN RCRD: CPT | Mod: CPTII,,, | Performed by: INTERNAL MEDICINE

## 2023-06-22 RX ORDER — HYDROCHLOROTHIAZIDE 12.5 MG/1
12.5 TABLET ORAL
COMMUNITY
Start: 2023-04-15

## 2023-06-22 RX ORDER — ACETAMINOPHEN 500 MG
2000 TABLET ORAL
COMMUNITY

## 2023-06-22 NOTE — PROGRESS NOTES
Luis Angeles MD        PATIENT NAME: Melina Lloyd  : 1950  DATE: 23  MRN: [id number]      Patient Care Team:  Luis Angeles MD as PCP - General (Internal Medicine)       Billing Provider: Luis Angeles MD  Level of Service: [place] MEDICARE ANNUAL WELLNESS SUBSEQUENT VISIT  Patient PCP Information       Provider PCP Type    Luis Angeles MD General            Reason for Visit / Chief Complaint: Medicare AWV (Wellness/)       Update PCP  Update Chief Complaint         History of Present Illness / Problem Focused Workflow     Melina Lloyd presents to the clinic with Medicare AWV (Wellness/)     Melina is here for annual Medicare wellness exam   She is feeling well with no problems   She had her mammogram earlier this month which was normal.      Review of Systems   Review of Systems   Constitutional: Negative.    HENT: Negative.     Eyes: Negative.    Respiratory: Negative.     Cardiovascular: Negative.    Gastrointestinal: Negative.    Endocrine: Negative.    Genitourinary: Negative.    Musculoskeletal: Negative.    Integumentary:  Negative.   Neurological: Negative.    Psychiatric/Behavioral: Negative.        Patient Reported Health Risk Assessment       Medical / Social / Family History     Past Medical History:   Diagnosis Date    Essential (primary) hypertension     Low vitamin D level     Mixed hyperlipidemia        Past Surgical History:   Procedure Laterality Date     SECTION      HYSTERECTOMY         Social History  Ms. Lloyd  reports that she has been smoking cigarettes. She has been smoking an average of 1 pack per day. She has never used smokeless tobacco. She reports that she does not currently use alcohol. She reports that she does not use drugs.    Family History  Ms.'s Lloyd  family history includes Asthma in her father; Cancer in her father; Hypertension in her mother.        Medications and Allergies     Medications  Outpatient Medications Marked  as Taking for the 6/22/23 encounter (Office Visit) with Luis Angeles MD   Medication Sig Dispense Refill    amLODIPine (NORVASC) 5 MG tablet Take 1 tablet by mouth once daily 90 tablet 0    atorvastatin (LIPITOR) 40 MG tablet Take 1 tablet by mouth once daily 90 tablet 0    cholecalciferol, vitamin D3, (VITAMIN D3) 50 mcg (2,000 unit) Cap capsule Take 2,000 Units by mouth.      hydroCHLOROthiazide (HYDRODIURIL) 12.5 MG Tab Take 12.5 mg by mouth.      metoprolol tartrate (LOPRESSOR) 25 MG tablet Take 1 tablet by mouth once daily 90 tablet 0       Allergies  Review of patient's allergies indicates:   Allergen Reactions    Prednisone Itching       Physical Examination     Vitals:    06/22/23 0829   BP: 122/68   Pulse: 89     Physical Exam  Constitutional:       Appearance: Normal appearance.   HENT:      Head: Normocephalic and atraumatic.      Right Ear: Tympanic membrane normal.      Left Ear: Tympanic membrane normal.      Nose: Nose normal.      Mouth/Throat:      Mouth: Mucous membranes are moist.   Eyes:      Extraocular Movements: Extraocular movements intact.      Pupils: Pupils are equal, round, and reactive to light.   Cardiovascular:      Rate and Rhythm: Normal rate and regular rhythm.      Pulses: Normal pulses.   Pulmonary:      Effort: Pulmonary effort is normal.      Breath sounds: Normal breath sounds.   Abdominal:      General: Abdomen is flat. Bowel sounds are normal.      Palpations: Abdomen is soft.   Musculoskeletal:         General: Normal range of motion.      Cervical back: Normal range of motion and neck supple.   Skin:     General: Skin is warm and dry.   Neurological:      General: No focal deficit present.      Mental Status: She is alert and oriented to person, place, and time.   Psychiatric:         Mood and Affect: Mood normal.         Behavior: Behavior normal.        No flowsheet data found.  Fall Risk Assessment - Outpatient 6/22/2023   Mobility Status Ambulatory   Number of  falls 0   Identified as fall risk 0                Assessment and Plan (including Health Maintenance)      Problem List  Smart Sets  Document Outside HM   :    Plan:   Wellness examination    Mixed hyperlipidemia    Essential (primary) hypertension    Screening for lung cancer     Continue medication   Discussed lab reports   CT lumbar low-dose schedule for smoking  Discussed smoking cessation   Revisit           Health Maintenance Due   Topic Date Due    Pneumococcal Vaccines (Age 65+) (1 - PCV) Never done    TETANUS VACCINE  Never done    Shingles Vaccine (1 of 2) Never done    COVID-19 Vaccine (3 - Pfizer series) 01/27/2022       Problem List Items Addressed This Visit          Cardiac/Vascular    Mixed hyperlipidemia (Chronic)    Essential (primary) hypertension (Chronic)     Other Visit Diagnoses       Wellness examination    -  Primary    Screening for lung cancer                Health Maintenance Topics with due status: Not Due       Topic Last Completion Date    Colorectal Cancer Screening 08/18/2018    DEXA Scan 05/04/2022    Mammogram 05/17/2023    Lipid Panel 05/25/2023    Influenza Vaccine Not Due       No future appointments.       Medicare Annual Wellness and Personalized Prevention Plan:   Fall Risk + Home Safety + Hearing Impairment + Depression Screen + Cognitive Impairment Screen + Health Risk Assessment all reviewed.         Advance Care Planning   I attest to discussing Advance Care Planning with patient and/or family member.  Education was provided including the importance of the Health Care Power of , Advance Directives, and/or [place] documentation.  The patient expressed understanding to the importance of this information and discussion.       Opioid Screening: Patient medication list reviewed, patient is not taking prescription opioids. Patient is not using additional opioids than prescribed. Patient is at low risk of substance abuse based on this opioid use history.        Signature:   Luis Schreiber MD  [facility]  [address]  [place] [address]    Date of encounter: 6/22/23    Follow up in about 1 year (around 6/22/2024) for Medicare Wellness with labs. In addition to their scheduled follow up, the patient has also been instructed to follow up on as needed basis.

## 2023-06-28 ENCOUNTER — HOSPITAL ENCOUNTER (OUTPATIENT)
Dept: RADIOLOGY | Facility: HOSPITAL | Age: 73
Discharge: HOME | End: 2023-06-28
Attending: INTERNAL MEDICINE
Payer: MEDICARE

## 2023-06-28 DIAGNOSIS — Z12.2 SCREENING FOR LUNG CANCER: ICD-10-CM

## 2023-06-28 PROCEDURE — 71271 CT THORAX LUNG CANCER SCR C-: CPT | Mod: TC

## 2023-06-29 ENCOUNTER — HOSPITAL ENCOUNTER (EMERGENCY)
Facility: HOSPITAL | Age: 73
Discharge: HOME | End: 2023-06-29
Attending: STUDENT IN AN ORGANIZED HEALTH CARE EDUCATION/TRAINING PROGRAM
Payer: MEDICARE

## 2023-06-29 VITALS
HEIGHT: 60 IN | DIASTOLIC BLOOD PRESSURE: 86 MMHG | HEART RATE: 86 BPM | BODY MASS INDEX: 21.85 KG/M2 | OXYGEN SATURATION: 100 % | SYSTOLIC BLOOD PRESSURE: 137 MMHG | RESPIRATION RATE: 18 BRPM | TEMPERATURE: 98 F | WEIGHT: 111.31 LBS

## 2023-06-29 DIAGNOSIS — S61.211A LACERATION OF LEFT INDEX FINGER WITHOUT FOREIGN BODY WITHOUT DAMAGE TO NAIL, INITIAL ENCOUNTER: Primary | ICD-10-CM

## 2023-06-29 PROCEDURE — 63600175 PHARM REV CODE 636 W HCPCS: Performed by: PHYSICIAN ASSISTANT

## 2023-06-29 PROCEDURE — 90471 IMMUNIZATION ADMIN: CPT | Performed by: PHYSICIAN ASSISTANT

## 2023-06-29 PROCEDURE — 90715 TDAP VACCINE 7 YRS/> IM: CPT | Performed by: PHYSICIAN ASSISTANT

## 2023-06-29 PROCEDURE — 25000003 PHARM REV CODE 250: Performed by: PHYSICIAN ASSISTANT

## 2023-06-29 PROCEDURE — 12001 RPR S/N/AX/GEN/TRNK 2.5CM/<: CPT

## 2023-06-29 PROCEDURE — 99284 EMERGENCY DEPT VISIT MOD MDM: CPT | Mod: 25

## 2023-06-29 RX ORDER — BACITRACIN ZINC 500 [USP'U]/G
1 OINTMENT TOPICAL
Status: COMPLETED | OUTPATIENT
Start: 2023-06-29 | End: 2023-06-29

## 2023-06-29 RX ORDER — LIDOCAINE HYDROCHLORIDE 10 MG/ML
5 INJECTION, SOLUTION EPIDURAL; INFILTRATION; INTRACAUDAL; PERINEURAL
Status: COMPLETED | OUTPATIENT
Start: 2023-06-29 | End: 2023-06-29

## 2023-06-29 RX ADMIN — BACITRACIN 1 EACH: 500 OINTMENT TOPICAL at 03:06

## 2023-06-29 RX ADMIN — TETANUS TOXOID, REDUCED DIPHTHERIA TOXOID AND ACELLULAR PERTUSSIS VACCINE, ADSORBED 0.5 ML: 5; 2.5; 8; 8; 2.5 SUSPENSION INTRAMUSCULAR at 02:06

## 2023-06-29 RX ADMIN — LIDOCAINE HYDROCHLORIDE 50 MG: 10 INJECTION, SOLUTION EPIDURAL; INFILTRATION; INTRACAUDAL; PERINEURAL at 02:06

## 2023-06-29 NOTE — ED PROVIDER NOTES
Encounter Date: 2023       History     Chief Complaint   Patient presents with    Laceration     Laceration to the left first finger from steak knife. Bleeding over 2 hours; no blood thinners.     73 yo F w/ PMHx significant for HTN & HLD presents to ED w/ laceration to palmar aspect L index finger. Patient cut her self w/ steak knife while cutting packaging open. Reports she attempted compression at home for over 2 hours, but every time she would remove dressing bleeding would return. Reports only mild localized pain, otherwise denies all complaints. Denies presyncope, syncope, lightheadedness, CP, SOB, numbness, paresthesia, pallor, poikilothermia, paralysis, hx of easy bleeding/bruising. Mildly tachycardic on arrival, vitals otherwise stable. Patient in NAD.    Review of patient's allergies indicates:   Allergen Reactions    Shellfish containing products Anaphylaxis    Prednisone Itching     Past Medical History:   Diagnosis Date    Essential (primary) hypertension     Low vitamin D level     Mixed hyperlipidemia      Past Surgical History:   Procedure Laterality Date     SECTION      HYSTERECTOMY       Family History   Problem Relation Age of Onset    Hypertension Mother     Cancer Father     Asthma Father      Social History     Tobacco Use    Smoking status: Every Day     Packs/day: 1.00     Types: Cigarettes    Smokeless tobacco: Never   Substance Use Topics    Alcohol use: Not Currently    Drug use: Never     Review of Systems   All other systems reviewed and are negative.    Physical Exam     Initial Vitals [23 1228]   BP Pulse Resp Temp SpO2   (!) 151/85 108 16 98.3 °F (36.8 °C) 100 %      MAP       --         Physical Exam    Nursing note and vitals reviewed.  Constitutional: She appears well-developed and well-nourished. She is not diaphoretic. No distress.   HENT:   Head: Normocephalic and atraumatic.   Mouth/Throat: Oropharynx is clear and moist.   Eyes: Conjunctivae and EOM are  normal. Pupils are equal, round, and reactive to light.   Neck: Neck supple.   Normal range of motion.  Cardiovascular:  Normal rate, regular rhythm, normal heart sounds and intact distal pulses.     Exam reveals no gallop and no friction rub.       No murmur heard.  Pulmonary/Chest: Breath sounds normal. No respiratory distress. She has no wheezes. She has no rhonchi. She has no rales.   Musculoskeletal:         General: No tenderness or edema. Normal range of motion.        Hands:       Cervical back: Normal range of motion and neck supple.     Neurological: She is alert and oriented to person, place, and time. She has normal strength. No sensory deficit.   Skin: Skin is warm and dry. Capillary refill takes less than 2 seconds. No rash noted. No pallor.   Psychiatric: She has a normal mood and affect. Thought content normal.       ED Course   Lac Repair    Date/Time: 6/29/2023 3:15 PM  Performed by: DAVIS Velásquez  Authorized by: Benny Gregory MD     Consent:     Consent obtained:  Verbal    Consent given by:  Patient    Risks, benefits, and alternatives were discussed: yes      Risks discussed:  Infection, pain, need for additional repair, poor cosmetic result, poor wound healing, nerve damage and vascular damage    Alternatives discussed:  No treatment, delayed treatment, observation and referral  Universal protocol:     Procedure explained and questions answered to patient or proxy's satisfaction: yes      Patient identity confirmed:  Verbally with patient and arm band  Anesthesia:     Anesthesia method:  Local infiltration    Local anesthetic:  Lidocaine 1% w/o epi  Laceration details:     Location:  Finger    Finger location:  L index finger    Length (cm):  0.5    Depth (mm):  1  Pre-procedure details:     Preparation:  Imaging obtained to evaluate for foreign bodies  Exploration:     Contaminated: no    Treatment:     Area cleansed with:  Povidone-iodine and saline    Amount of cleaning:  Extensive     Irrigation solution:  Sterile saline    Irrigation volume:  250    Irrigation method:  Pressure wash  Skin repair:     Repair method:  Sutures    Suture size:  5-0    Suture material:  Nylon    Suture technique:  Simple interrupted    Number of sutures:  2  Approximation:     Approximation:  Close  Repair type:     Repair type:  Simple  Post-procedure details:     Dressing:  Antibiotic ointment and sterile dressing    Procedure completion:  Tolerated well, no immediate complications  Labs Reviewed - No data to display       Imaging Results              X-Ray Hand 3 view Left (Final result)  Result time 06/29/23 13:16:33      Final result by Jamshid Farris MD (06/29/23 13:16:33)                   Impression:      No acute abnormalities are seen      Electronically signed by: Jamshid Farris MD  Date:    06/29/2023  Time:    13:16               Narrative:    EXAMINATION:  XR HAND COMPLETE 3 VIEW LEFT    CLINICAL HISTORY:  L index finger lac;.    TECHNIQUE:  PA, lateral, and oblique views of the left hand were performed.    COMPARISON:  None    FINDINGS:  There are no fractures seen.  There is no dislocation.  There are no bony lesions noted                                       Medications   Tdap (BOOSTRIX) vaccine injection 0.5 mL (0.5 mLs Intramuscular Given 6/29/23 1448)   LIDOcaine (PF) 10 mg/ml (1%) injection 50 mg (50 mg Infiltration Given by Provider 6/29/23 1430)   bacitracin zinc ointment 1 each (1 each Topical (Top) Given 6/29/23 1527)     Medical Decision Making:   Clinical Tests:   Radiological Study: Ordered and Reviewed  No foreign body or acute osseous abnormality on XR. Patient is NVI. Laceration sutured w/o complication & bleeding controlled. Bacitracin & clean dressing applied. Home hygiene instructions given. Instructed to return to ED in 5-7 days for suture removal, or to return sooner for any complications.     APC / Resident Notes:   I was not physically present during the history, exam and  disposition of this patient.  I was available at all times for consultation. (Bri)                      Clinical Impression:   Final diagnoses:  [S62.211A] Laceration of left index finger without foreign body without damage to nail, initial encounter (Primary)        ED Disposition Condition    Discharge Good          ED Prescriptions    None       Follow-up Information       Follow up With Specialties Details Why Contact Info    [facility] Emergency Medicine  Return to ED in 5-7 days for suture removal. Return sooner for any complications. [address] [address] [phone]             DAVIS Velásquez  06/29/23 1521       Benny Gregory MD  06/29/23 1924

## 2023-06-29 NOTE — DISCHARGE INSTRUCTIONS
Report to Emergency Department if symptoms return or worsen; [facility] Within 1 to 2 days, It is important that you follow up with your primary care provider or specialist if indicated for further evaluation, workup, and treatment as necessary. The exam and treatment you received in Emergency Department was for an urgent problem and NOT INTENDED AS COMPLETE CARE. It is important that you FOLLOW UP with a doctor for ongoing care. If your symptoms become WORSE or you DO NOT IMPROVE and you are unable to reach your health care provider, you should RETURN to the Emergency Department. The Emergency Department provider has provided a PRELIMINARY INTERPRETATION of all your studies. A final interpretation may be done after you are discharged. If a change in your diagnosis or treatment is needed WE WILL CONTACT YOU. It is critical that we have a CURRENT PHONE NUMBER FOR YOU.

## 2023-07-03 ENCOUNTER — TELEPHONE (OUTPATIENT)
Dept: INTERNAL MEDICINE | Facility: CLINIC | Age: 73
End: 2023-07-03
Payer: MEDICARE

## 2023-07-03 DIAGNOSIS — R91.1 LUNG NODULE: Primary | ICD-10-CM

## 2023-07-03 NOTE — TELEPHONE ENCOUNTER
Low-dose CT chest results   A new small nodule is found recommend referral to lung mass plan pulmonology.

## 2023-07-05 ENCOUNTER — HOSPITAL ENCOUNTER (EMERGENCY)
Facility: HOSPITAL | Age: 73
Discharge: HOME | End: 2023-07-05
Attending: STUDENT IN AN ORGANIZED HEALTH CARE EDUCATION/TRAINING PROGRAM
Payer: MEDICARE

## 2023-07-05 VITALS
HEART RATE: 83 BPM | TEMPERATURE: 98 F | BODY MASS INDEX: 21.75 KG/M2 | WEIGHT: 110.81 LBS | HEIGHT: 60 IN | DIASTOLIC BLOOD PRESSURE: 72 MMHG | SYSTOLIC BLOOD PRESSURE: 134 MMHG | RESPIRATION RATE: 18 BRPM | OXYGEN SATURATION: 99 %

## 2023-07-05 DIAGNOSIS — R91.1 LUNG NODULE: Primary | ICD-10-CM

## 2023-07-05 DIAGNOSIS — Z48.02 VISIT FOR SUTURE REMOVAL: Primary | ICD-10-CM

## 2023-07-05 PROCEDURE — 99282 EMERGENCY DEPT VISIT SF MDM: CPT

## 2023-07-05 NOTE — ED PROVIDER NOTES
Encounter Date: 2023       History     Chief Complaint   Patient presents with    Suture / Staple Removal     Requesting suture removal from left index finger. Have been in place x1 week.      72-year-old female presents to ED for suture removal.  States a week ago she had 2 sutures placed at the distal fingertip secondary to bleeding from localized knife laceration.  States healed wonderfully.  No pain since.  No further bleeding.  No discharge or drainage.  Has intermittently applied Neosporin.  Two sutures in place.    Review of patient's allergies indicates:   Allergen Reactions    Shellfish containing products Anaphylaxis    Prednisone Itching     Past Medical History:   Diagnosis Date    Essential (primary) hypertension     Low vitamin D level     Mixed hyperlipidemia      Past Surgical History:   Procedure Laterality Date     SECTION      HYSTERECTOMY       Family History   Problem Relation Age of Onset    Hypertension Mother     Cancer Father     Asthma Father      Social History     Tobacco Use    Smoking status: Every Day     Packs/day: 1.00     Types: Cigarettes    Smokeless tobacco: Never   Substance Use Topics    Alcohol use: Not Currently    Drug use: Never     Review of Systems   Constitutional:  Negative for chills, diaphoresis and fever.   HENT:  Negative for congestion, rhinorrhea, sinus pain and sore throat.    Eyes:  Negative for pain, discharge and itching.   Respiratory:  Negative for cough, chest tightness and shortness of breath.    Cardiovascular:  Negative for chest pain and palpitations.   Gastrointestinal:  Negative for abdominal pain, nausea and vomiting.   Genitourinary:  Negative for dysuria, flank pain and hematuria.   Musculoskeletal:  Negative for back pain and myalgias.   Skin:  Negative for color change and rash.        2 sutures in left index finger   Neurological:  Negative for dizziness, weakness and headaches.   Psychiatric/Behavioral:  Negative for  confusion. The patient is not hyperactive.      Physical Exam     Initial Vitals [07/05/23 0923]   BP Pulse Resp Temp SpO2   134/72 83 18 97.9 °F (36.6 °C) 99 %      MAP       --         Physical Exam    Vitals reviewed.  Constitutional: She appears well-developed and well-nourished. She is not diaphoretic. No distress.   HENT:   Head: Normocephalic and atraumatic.   Eyes: Conjunctivae and EOM are normal. Pupils are equal, round, and reactive to light.   Neck: Neck supple. No tracheal deviation present.   Normal range of motion.  Cardiovascular:  Normal rate, regular rhythm, normal heart sounds and intact distal pulses.           Pulmonary/Chest: Breath sounds normal. No respiratory distress.   Abdominal: Abdomen is soft. There is no abdominal tenderness. There is no rebound and no guarding.   Musculoskeletal:         General: Normal range of motion.        Hands:       Cervical back: Normal range of motion and neck supple.     Neurological: She is alert and oriented to person, place, and time. She has normal strength. GCS score is 15. GCS eye subscore is 4. GCS verbal subscore is 5. GCS motor subscore is 6.   Skin: Skin is warm and dry. Capillary refill takes less than 2 seconds. No rash noted.   Psychiatric: She has a normal mood and affect. Her behavior is normal. Judgment and thought content normal.       ED Course   Suture Removal    Date/Time: 7/5/2023 9:46 AM  Location procedure was performed: [facility] EMERGENCY DEPARTMENT  Performed by: Benny Gregory MD  Authorized by: Benny Gregory MD   Body area: upper extremity  Location details: left index finger  Wound Appearance: well healed  Sutures Removed: 2  Post-removal: no dressing applied  Complications: No  Patient tolerance: Patient tolerated the procedure well with no immediate complications      Labs Reviewed - No data to display       Imaging Results    None          Medications - No data to display  Medical Decision Making:   History:   Old Medical Records: I  decided to obtain old medical records.  Initial Assessment:   Very pleasant 72-year-old female presents for suture removal 1 week after placement for laceration and bleeding closure  Differential Diagnosis:   Suture  Cellulitis  Wound infection  Dehiscence  Trauma  ED Management:  Two sutures were easily visible and removed without complication.  Wound well healed.  Recommended keeping clean and dry at time.  Will follow up closely in the outpatient setting. Vitals stable.  Patient informed of all findings and stable for release. (Bri)                         Clinical Impression:   Final diagnoses:  [Z48.02] Visit for suture removal (Primary)        ED Disposition Condition    Discharge Stable          ED Prescriptions    None       Follow-up Information       Follow up With Specialties Details Why Contact Info    Luis Angeles MD Internal Medicine Schedule an appointment as soon as possible for a visit  As needed [address] [facility] [address]  [phone]      [facility] Emergency Medicine  As needed, If symptoms worsen [address] [facility] [address] [phone]             Benny Gregory MD  07/09/23 1407

## 2023-10-24 ENCOUNTER — OFFICE VISIT (OUTPATIENT)
Dept: URGENT CARE | Facility: CLINIC | Age: 73
End: 2023-10-24
Payer: MEDICARE

## 2023-10-24 VITALS
OXYGEN SATURATION: 98 % | SYSTOLIC BLOOD PRESSURE: 119 MMHG | HEART RATE: 82 BPM | HEIGHT: 61 IN | DIASTOLIC BLOOD PRESSURE: 76 MMHG | BODY MASS INDEX: 21.03 KG/M2 | RESPIRATION RATE: 18 BRPM | WEIGHT: 111.38 LBS | TEMPERATURE: 98 F

## 2023-10-24 DIAGNOSIS — J02.9 PHARYNGITIS, UNSPECIFIED ETIOLOGY: ICD-10-CM

## 2023-10-24 DIAGNOSIS — B37.0 ORAL THRUSH: Primary | ICD-10-CM

## 2023-10-24 LAB
CTP QC/QA: YES
MOLECULAR STREP A: NEGATIVE

## 2023-10-24 PROCEDURE — 99213 PR OFFICE/OUTPT VISIT, EST, LEVL III, 20-29 MIN: ICD-10-PCS | Mod: S$PBB,,, | Performed by: NURSE PRACTITIONER

## 2023-10-24 PROCEDURE — 99213 OFFICE O/P EST LOW 20 MIN: CPT | Mod: S$PBB,,, | Performed by: NURSE PRACTITIONER

## 2023-10-24 PROCEDURE — 99214 OFFICE O/P EST MOD 30 MIN: CPT | Mod: PBBFAC | Performed by: NURSE PRACTITIONER

## 2023-10-24 PROCEDURE — 87651 STREP A DNA AMP PROBE: CPT | Mod: PBBFAC | Performed by: NURSE PRACTITIONER

## 2023-10-24 RX ORDER — NYSTATIN 100000 [USP'U]/ML
4 SUSPENSION ORAL
Qty: 120 ML | Refills: 0 | Status: SHIPPED | OUTPATIENT
Start: 2023-10-24 | End: 2023-10-31

## 2023-10-24 NOTE — PROGRESS NOTES
"Subjective:      Patient ID: Melina Lloyd is a 73 y.o. female.    Vitals:  height is 5' 1" (1.549 m) and weight is 50.5 kg (111 lb 6.4 oz). Her oral temperature is 98 °F (36.7 °C). Her blood pressure is 119/76 and her pulse is 82. Her respiration is 18 and oxygen saturation is 98%.     Chief Complaint: Thrush (Patient reports thick white coat on tongue x1 week )    HPI Presents with c/o thick white coat on tongue and back of throat sticking pain worsening over one week. PT reports cleaning and soaking dentures with Efferent to help with white film on tongue and brushing with no success. PT denies taking any medication for pain. Denies rash, fever, headache dizziness abd pain n/v/d. No recent antibiotic use. Pt is a smoker.  ROS   Objective:     Physical Exam   Constitutional: She appears well-developed.  Non-toxic appearance. She does not appear ill. No distress.   HENT:   Head: Atraumatic.   Nose: No purulent discharge. Right sinus exhibits no maxillary sinus tenderness and no frontal sinus tenderness. Left sinus exhibits no maxillary sinus tenderness and no frontal sinus tenderness.   Mouth/Throat: Uvula is midline and mucous membranes are normal. Mucous membranes are moist. Posterior oropharyngeal erythema present. No oropharyngeal exudate, posterior oropharyngeal edema or tonsillar abscesses.      Comments: Pt has film over tongue, pt reports drinking coffee this am so film is brown colores. Mucosa is moist and pharynx of red without erythema.  Eyes: Right eye exhibits no discharge. Left eye exhibits no discharge. Extraocular movement intact   Neck: Neck supple. No neck rigidity present.   Cardiovascular: Regular rhythm and normal pulses.   Pulmonary/Chest: Effort normal and breath sounds normal. No respiratory distress. She has no wheezes. She has no rales.   Abdominal: Normal appearance. She exhibits no distension. Soft.   Lymphadenopathy:     She has no cervical adenopathy.   Neurological: She is alert. "   Skin: Skin is warm, dry and not diaphoretic.   Psychiatric: Her behavior is normal.   Nursing note and vitals reviewed.      Assessment:     1. Oral thrush    2. Pharyngitis, unspecified etiology      Results for orders placed or performed in visit on 10/24/23   POCT Strep A, Molecular   Result Value Ref Range    Molecular Strep A, POC Negative Negative     Acceptable Yes          Plan:   Please follow instructions on patient education material.      Return to urgent care in 2 to 3 days if symptoms are not improving, immediately if you develop any new or worsening symptoms.     F/u with PCP    Oral thrush  -     nystatin (MYCOSTATIN) 100,000 unit/mL suspension; Take 4 mLs (400,000 Units total) by mouth 4 (four) times daily with meals and nightly. for 7 days  Dispense: 120 mL; Refill: 0    Pharyngitis, unspecified etiology  -     POCT Strep A, Molecular

## 2023-10-24 NOTE — PATIENT INSTRUCTIONS
Please follow instructions on patient education material.      Return to urgent care in 2 to 3 days if symptoms are not improving, immediately if you develop any new or worsening symptoms.     - Plenty of fluids  - Humidified air      F/u with PCP

## 2023-11-20 ENCOUNTER — TELEPHONE (OUTPATIENT)
Dept: INTERNAL MEDICINE | Facility: CLINIC | Age: 73
End: 2023-11-20
Payer: MEDICARE

## 2023-11-20 ENCOUNTER — LAB VISIT (OUTPATIENT)
Dept: LAB | Facility: HOSPITAL | Age: 73
End: 2023-11-20
Payer: MEDICARE

## 2023-11-20 DIAGNOSIS — R30.0 BURNING WITH URINATION: ICD-10-CM

## 2023-11-20 DIAGNOSIS — R30.0 BURNING WITH URINATION: Primary | ICD-10-CM

## 2023-11-20 LAB
APPEARANCE UR: CLEAR
BACTERIA #/AREA URNS AUTO: ABNORMAL /HPF
BILIRUB UR QL STRIP.AUTO: NEGATIVE
COLOR UR AUTO: ABNORMAL
GLUCOSE UR QL STRIP.AUTO: NORMAL
KETONES UR QL STRIP.AUTO: NEGATIVE
LEUKOCYTE ESTERASE UR QL STRIP.AUTO: NEGATIVE
MUCOUS THREADS URNS QL MICRO: ABNORMAL /LPF
NITRITE UR QL STRIP.AUTO: NEGATIVE
PH UR STRIP.AUTO: 6 [PH]
PROT UR QL STRIP.AUTO: NEGATIVE
RBC #/AREA URNS AUTO: ABNORMAL /HPF
RBC UR QL AUTO: NEGATIVE
SP GR UR STRIP.AUTO: 1.01 (ref 1–1.03)
SQUAMOUS #/AREA URNS LPF: ABNORMAL /HPF
UROBILINOGEN UR STRIP-ACNC: NORMAL
WBC #/AREA URNS AUTO: ABNORMAL /HPF

## 2023-11-20 PROCEDURE — 81001 URINALYSIS AUTO W/SCOPE: CPT

## 2023-11-20 NOTE — TELEPHONE ENCOUNTER
Spoke with patient. Ordered U/A per patient symptoms. She also wanted bloodwork. Let her know if she needed more than a U/A she would need to be evaluated. She should see urgent care, she will get U/A done

## 2023-11-20 NOTE — TELEPHONE ENCOUNTER
----- Message from Ml Padilla sent at 11/20/2023  8:28 AM CST -----  Regarding: medical advice  Type:  Needs Medical Advice    Who Called: Karen  Symptoms (please be specific): pain in kidney area burning when urinating    How long has patient had these symptoms:    Pharmacy name and phone #:    Would the patient rather a call back or a response via [facility]? Call back  Best Call Back Number: [phone]  Additional Information: zhang called requesting a call back to get orders for a possible urinalysis

## 2023-11-27 ENCOUNTER — TELEPHONE (OUTPATIENT)
Dept: INTERNAL MEDICINE | Facility: CLINIC | Age: 73
End: 2023-11-27
Payer: MEDICARE

## 2023-11-27 NOTE — TELEPHONE ENCOUNTER
Attempted to contact pt was unsuccessful left voicemail to return call, results of urine was negative and pt had no UTI.

## 2023-11-27 NOTE — TELEPHONE ENCOUNTER
----- Message from Coby Archie sent at 11/27/2023 11:06 AM CST -----  .Type:  Test Results    Who Called: pt  Name of Test (Lab/Mammo/Etc): lab   Date of Test: last week  Ordering Provider: Abdoul  Where the test was performed:   Would the patient rather a call back or a response via [facility]?   Best Call Back Number: [phone]  Additional Information:  wanting results

## 2024-01-05 ENCOUNTER — HOSPITAL ENCOUNTER (OUTPATIENT)
Dept: RADIOLOGY | Facility: HOSPITAL | Age: 74
Discharge: HOME | End: 2024-01-05
Attending: INTERNAL MEDICINE
Payer: MEDICARE

## 2024-01-05 DIAGNOSIS — R91.1 LUNG NODULE: ICD-10-CM

## 2024-01-05 PROCEDURE — 71250 CT THORAX DX C-: CPT | Mod: TC

## 2024-04-29 ENCOUNTER — TELEPHONE (OUTPATIENT)
Dept: INTERNAL MEDICINE | Facility: CLINIC | Age: 74
End: 2024-04-29
Payer: MEDICARE

## 2024-04-29 DIAGNOSIS — Z12.31 ENCOUNTER FOR SCREENING MAMMOGRAM FOR BREAST CANCER: ICD-10-CM

## 2024-04-29 DIAGNOSIS — N64.4 BREAST PAIN, RIGHT: Primary | ICD-10-CM

## 2024-04-29 NOTE — TELEPHONE ENCOUNTER
----- Message from Ml Randy sent at 4/29/2024  3:05 PM CDT -----  Regarding: Mammo orders and ultrasound  Type:  Mammogram    Caller is requesting to schedule their annual mammogram appointment.  Order is not listed in EPIC.  Please enter order and contact patient to schedule.  Name of Caller:Tami    Where would they like the mammogram perform; [facility]    Would the patient rather a call back or a response via [facility]?     Best Call Back Number:[phone]    Additional Information: Melina is requesting mammogram orders and an ultrasound due to pain  in her right breast.

## 2024-06-10 ENCOUNTER — LAB VISIT (OUTPATIENT)
Dept: LAB | Facility: HOSPITAL | Age: 74
End: 2024-06-10
Attending: INTERNAL MEDICINE
Payer: MEDICARE

## 2024-06-10 DIAGNOSIS — I10 ESSENTIAL (PRIMARY) HYPERTENSION: ICD-10-CM

## 2024-06-10 DIAGNOSIS — E78.2 MIXED HYPERLIPIDEMIA: ICD-10-CM

## 2024-06-10 LAB
ALBUMIN SERPL-MCNC: 4 G/DL (ref 3.4–4.8)
ALBUMIN/GLOB SERPL: 1.3 RATIO (ref 1.1–2)
ALP SERPL-CCNC: 93 UNIT/L (ref 40–150)
ALT SERPL-CCNC: 16 UNIT/L (ref 0–55)
ANION GAP SERPL CALC-SCNC: 6 MEQ/L
AST SERPL-CCNC: 16 UNIT/L (ref 5–34)
BASOPHILS # BLD AUTO: 0.03 X10(3)/MCL
BASOPHILS NFR BLD AUTO: 0.4 %
BILIRUB SERPL-MCNC: 0.6 MG/DL
BUN SERPL-MCNC: 8.9 MG/DL (ref 9.8–20.1)
CALCIUM SERPL-MCNC: 9.9 MG/DL (ref 8.4–10.2)
CHLORIDE SERPL-SCNC: 105 MMOL/L (ref 98–107)
CHOLEST SERPL-MCNC: 135 MG/DL
CHOLEST/HDLC SERPL: 3 {RATIO} (ref 0–5)
CO2 SERPL-SCNC: 28 MMOL/L (ref 23–31)
CREAT SERPL-MCNC: 0.93 MG/DL (ref 0.55–1.02)
CREAT/UREA NIT SERPL: 10
EOSINOPHIL # BLD AUTO: 0.18 X10(3)/MCL (ref 0–0.9)
EOSINOPHIL NFR BLD AUTO: 2.2 %
ERYTHROCYTE [DISTWIDTH] IN BLOOD BY AUTOMATED COUNT: 13.3 % (ref 11.5–17)
GFR SERPLBLD CREATININE-BSD FMLA CKD-EPI: >60 ML/MIN/1.73/M2
GLOBULIN SER-MCNC: 3 GM/DL (ref 2.4–3.5)
GLUCOSE SERPL-MCNC: 89 MG/DL (ref 82–115)
HCT VFR BLD AUTO: 39 % (ref 37–47)
HDLC SERPL-MCNC: 42 MG/DL (ref 35–60)
HGB BLD-MCNC: 12.8 G/DL (ref 12–16)
IMM GRANULOCYTES # BLD AUTO: 0.02 X10(3)/MCL (ref 0–0.04)
IMM GRANULOCYTES NFR BLD AUTO: 0.2 %
LDLC SERPL CALC-MCNC: 78 MG/DL (ref 50–140)
LYMPHOCYTES # BLD AUTO: 2.63 X10(3)/MCL (ref 0.6–4.6)
LYMPHOCYTES NFR BLD AUTO: 31.7 %
MCH RBC QN AUTO: 31.8 PG (ref 27–31)
MCHC RBC AUTO-ENTMCNC: 32.8 G/DL (ref 33–36)
MCV RBC AUTO: 96.8 FL (ref 80–94)
MONOCYTES # BLD AUTO: 0.46 X10(3)/MCL (ref 0.1–1.3)
MONOCYTES NFR BLD AUTO: 5.5 %
NEUTROPHILS # BLD AUTO: 4.98 X10(3)/MCL (ref 2.1–9.2)
NEUTROPHILS NFR BLD AUTO: 60 %
NRBC BLD AUTO-RTO: 0 %
PLATELET # BLD AUTO: 260 X10(3)/MCL (ref 130–400)
PMV BLD AUTO: 10.5 FL (ref 7.4–10.4)
POTASSIUM SERPL-SCNC: 4.6 MMOL/L (ref 3.5–5.1)
PROT SERPL-MCNC: 7 GM/DL (ref 5.8–7.6)
RBC # BLD AUTO: 4.03 X10(6)/MCL (ref 4.2–5.4)
SODIUM SERPL-SCNC: 139 MMOL/L (ref 136–145)
TRIGL SERPL-MCNC: 77 MG/DL (ref 37–140)
VLDLC SERPL CALC-MCNC: 15 MG/DL
WBC # SPEC AUTO: 8.3 X10(3)/MCL (ref 4.5–11.5)

## 2024-06-10 PROCEDURE — 36415 COLL VENOUS BLD VENIPUNCTURE: CPT

## 2024-06-10 PROCEDURE — 80061 LIPID PANEL: CPT

## 2024-06-10 PROCEDURE — 85025 COMPLETE CBC W/AUTO DIFF WBC: CPT

## 2024-06-10 PROCEDURE — 80053 COMPREHEN METABOLIC PANEL: CPT

## 2024-06-19 ENCOUNTER — HOSPITAL ENCOUNTER (OUTPATIENT)
Dept: RADIOLOGY | Facility: HOSPITAL | Age: 74
Discharge: HOME | End: 2024-06-19
Attending: INTERNAL MEDICINE
Payer: MEDICARE

## 2024-06-19 DIAGNOSIS — N64.4 BREAST PAIN, RIGHT: ICD-10-CM

## 2024-06-19 DIAGNOSIS — Z12.31 ENCOUNTER FOR SCREENING MAMMOGRAM FOR BREAST CANCER: ICD-10-CM

## 2024-06-19 PROCEDURE — 77066 DX MAMMO INCL CAD BI: CPT | Mod: TC

## 2024-06-19 PROCEDURE — 77066 DX MAMMO INCL CAD BI: CPT | Mod: 26,,, | Performed by: RADIOLOGY

## 2024-06-19 PROCEDURE — 77062 BREAST TOMOSYNTHESIS BI: CPT | Mod: 26,,, | Performed by: RADIOLOGY

## 2024-06-19 PROCEDURE — 76641 ULTRASOUND BREAST COMPLETE: CPT | Mod: 26,RT,, | Performed by: RADIOLOGY

## 2024-06-19 PROCEDURE — 76641 ULTRASOUND BREAST COMPLETE: CPT | Mod: TC,RT

## 2024-06-21 PROBLEM — Z00.00 ENCOUNTER FOR ANNUAL WELLNESS EXAM IN MEDICARE PATIENT: Status: ACTIVE | Noted: 2024-06-21

## 2024-06-24 ENCOUNTER — OFFICE VISIT (OUTPATIENT)
Dept: INTERNAL MEDICINE | Facility: CLINIC | Age: 74
End: 2024-06-24
Payer: MEDICARE

## 2024-06-24 ENCOUNTER — HOSPITAL ENCOUNTER (OUTPATIENT)
Dept: RADIOLOGY | Facility: HOSPITAL | Age: 74
Discharge: HOME | End: 2024-06-24
Attending: INTERNAL MEDICINE
Payer: MEDICARE

## 2024-06-24 VITALS
OXYGEN SATURATION: 100 % | HEIGHT: 61 IN | WEIGHT: 113.81 LBS | BODY MASS INDEX: 21.49 KG/M2 | HEART RATE: 83 BPM | SYSTOLIC BLOOD PRESSURE: 124 MMHG | RESPIRATION RATE: 16 BRPM | DIASTOLIC BLOOD PRESSURE: 70 MMHG

## 2024-06-24 DIAGNOSIS — E78.2 MIXED HYPERLIPIDEMIA: Chronic | ICD-10-CM

## 2024-06-24 DIAGNOSIS — I10 ESSENTIAL (PRIMARY) HYPERTENSION: Chronic | ICD-10-CM

## 2024-06-24 DIAGNOSIS — R06.02 SOB (SHORTNESS OF BREATH): ICD-10-CM

## 2024-06-24 DIAGNOSIS — R10.84 GENERALIZED ABDOMINAL PAIN: ICD-10-CM

## 2024-06-24 DIAGNOSIS — Z00.00 ENCOUNTER FOR ANNUAL WELLNESS EXAM IN MEDICARE PATIENT: Primary | ICD-10-CM

## 2024-06-24 DIAGNOSIS — R10.9 ABDOMINAL PAIN, UNSPECIFIED ABDOMINAL LOCATION: ICD-10-CM

## 2024-06-24 PROCEDURE — 1160F RVW MEDS BY RX/DR IN RCRD: CPT | Mod: CPTII,,, | Performed by: INTERNAL MEDICINE

## 2024-06-24 PROCEDURE — 71046 X-RAY EXAM CHEST 2 VIEWS: CPT | Mod: TC

## 2024-06-24 PROCEDURE — 3288F FALL RISK ASSESSMENT DOCD: CPT | Mod: CPTII,,, | Performed by: INTERNAL MEDICINE

## 2024-06-24 PROCEDURE — 3078F DIAST BP <80 MM HG: CPT | Mod: CPTII,,, | Performed by: INTERNAL MEDICINE

## 2024-06-24 PROCEDURE — 1124F ACP DISCUSS-NO DSCNMKR DOCD: CPT | Mod: CPTII,,, | Performed by: INTERNAL MEDICINE

## 2024-06-24 PROCEDURE — 3074F SYST BP LT 130 MM HG: CPT | Mod: CPTII,,, | Performed by: INTERNAL MEDICINE

## 2024-06-24 PROCEDURE — 1159F MED LIST DOCD IN RCRD: CPT | Mod: CPTII,,, | Performed by: INTERNAL MEDICINE

## 2024-06-24 PROCEDURE — 1101F PT FALLS ASSESS-DOCD LE1/YR: CPT | Mod: CPTII,,, | Performed by: INTERNAL MEDICINE

## 2024-06-24 PROCEDURE — 1125F AMNT PAIN NOTED PAIN PRSNT: CPT | Mod: CPTII,,, | Performed by: INTERNAL MEDICINE

## 2024-06-24 PROCEDURE — G0439 PPPS, SUBSEQ VISIT: HCPCS | Mod: ,,, | Performed by: INTERNAL MEDICINE

## 2024-06-24 RX ORDER — METOPROLOL TARTRATE 25 MG/1
25 TABLET, FILM COATED ORAL DAILY
Qty: 90 TABLET | Refills: 0 | Status: SHIPPED | OUTPATIENT
Start: 2024-06-24

## 2024-06-24 RX ORDER — HYDROCHLOROTHIAZIDE 12.5 MG/1
12.5 TABLET ORAL DAILY
Qty: 90 TABLET | Refills: 3 | Status: SHIPPED | OUTPATIENT
Start: 2024-06-24

## 2024-06-24 RX ORDER — ATORVASTATIN CALCIUM 40 MG/1
40 TABLET, FILM COATED ORAL DAILY
Qty: 90 TABLET | Refills: 0 | Status: SHIPPED | OUTPATIENT
Start: 2024-06-24

## 2024-06-24 RX ORDER — AMLODIPINE BESYLATE 5 MG/1
5 TABLET ORAL DAILY
Qty: 90 TABLET | Refills: 0 | Status: SHIPPED | OUTPATIENT
Start: 2024-06-24

## 2024-06-24 NOTE — PROGRESS NOTES
Luis Angeles MD        PATIENT NAME: Melina Lloyd  : 1950  DATE: 24  MRN: [id number]      Patient Care Team:  Luis Angeles MD as PCP - General (Internal Medicine)       Billing Provider: Luis Angeles MD  Level of Service: [place] MEDICARE ANNUAL WELLNESS SUBSEQUENT VISIT  Patient PCP Information       Provider PCP Type    Luis Angeles MD General            Reason for Visit / Chief Complaint: Medicare AWV       Update PCP  Update Chief Complaint         History of Present Illness / Problem Focused Workflow     Melina Lloyd presents to the clinic with Medicare AWV     Melina is here for annual wellness exam   Her biggest issue says it felt and epigastric abdominal pain which he says sporadic and severe radiates to her back causing shortness a breath.  She has had no nausea vomiting her bowels are stable.        Review of Systems   Review of Systems   Constitutional: Negative.    HENT: Negative.     Eyes: Negative.    Respiratory:  Positive for shortness of breath.    Cardiovascular: Negative.    Gastrointestinal:  Positive for abdominal pain.   Endocrine: Negative.    Genitourinary: Negative.    Musculoskeletal: Negative.    Integumentary:  Negative.   Neurological: Negative.    Psychiatric/Behavioral: Negative.          Patient Reported Health Risk Assessment       Medical / Social / Family History     Past Medical History:   Diagnosis Date    Essential (primary) hypertension     Low vitamin D level     Mixed hyperlipidemia        Past Surgical History:   Procedure Laterality Date     SECTION      HYSTERECTOMY         Social History  Ms. Lloyd  reports that she has been smoking cigarettes. She has a 40 pack-year smoking history. She has never used smokeless tobacco. She reports that she does not currently use alcohol. She reports that she does not use drugs.    Family History  Ms.'s Lloyd  family history includes Asthma in her father; Breast cancer in her  maternal aunt; Hypertension in her mother; Liver cancer in her father; Lung cancer in her father.        Medications and Allergies     Medications  Outpatient Medications Marked as Taking for the 6/24/24 encounter (Office Visit) with Luis Angeles MD   Medication Sig Dispense Refill    amLODIPine (NORVASC) 5 MG tablet Take 1 tablet by mouth once daily 90 tablet 0    atorvastatin (LIPITOR) 40 MG tablet Take 1 tablet by mouth once daily 90 tablet 0    cholecalciferol, vitamin D3, (VITAMIN D3) 50 mcg (2,000 unit) Cap capsule Take 2,000 Units by mouth.      hydroCHLOROthiazide (HYDRODIURIL) 12.5 MG Tab Take 12.5 mg by mouth.      metoprolol tartrate (LOPRESSOR) 25 MG tablet Take 1 tablet by mouth once daily 90 tablet 0       Allergies  Review of patient's allergies indicates:   Allergen Reactions    Shellfish containing products Anaphylaxis    Prednisone Itching       Physical Examination     Vitals:    06/24/24 0828   BP: 124/70   Pulse: 83   Resp: 16     Physical Exam  Constitutional:       Appearance: Normal appearance.   HENT:      Head: Normocephalic and atraumatic.      Right Ear: Tympanic membrane normal.      Left Ear: Tympanic membrane normal.      Nose: Nose normal.      Mouth/Throat:      Mouth: Mucous membranes are moist.   Eyes:      Extraocular Movements: Extraocular movements intact.      Pupils: Pupils are equal, round, and reactive to light.   Cardiovascular:      Rate and Rhythm: Normal rate and regular rhythm.      Pulses: Normal pulses.   Pulmonary:      Effort: Pulmonary effort is normal.      Breath sounds: Normal breath sounds.   Abdominal:      General: Abdomen is flat. Bowel sounds are normal.      Palpations: Abdomen is soft.          Comments: Mild tenderness across the epigastric area of her abdomen   Musculoskeletal:         General: Normal range of motion.      Cervical back: Normal range of motion and neck supple.   Skin:     General: Skin is warm and dry.   Neurological:       General: No focal deficit present.      Mental Status: She is alert and oriented to person, place, and time.   Psychiatric:         Mood and Affect: Mood normal.         Behavior: Behavior normal.               No data to display                  6/24/2024     8:20 AM 1/9/2024    10:20 AM 7/17/2023    10:00 AM 6/22/2023     8:20 AM   Fall Risk Assessment - Outpatient   Mobility Status Ambulatory Ambulatory Ambulatory Ambulatory   Number of falls 0 0 0 0   Identified as fall risk False False False False                Assessment and Plan (including Health Maintenance)      Problem List  Smart Sets  Document Outside HM   :    Plan:       ICD-10-CM ICD-9-CM   1. Encounter for annual wellness exam in Medicare patient  Z00.00 V70.0   2. Essential (primary) hypertension  I10 401.9   3. Mixed hyperlipidemia  E78.2 272.2   4. Generalized abdominal pain  R10.84 789.07               Health Maintenance Due   Topic Date Due    Pneumococcal Vaccines (Age 65+) (1 of 2 - PCV) Never done    Shingles Vaccine (1 of 2) Never done    RSV Vaccine (Age 60+ and Pregnant patients) (1 - 1-dose 60+ series) Never done    COVID-19 Vaccine (3 - 2023-24 season) 09/01/2023       Problem List Items Addressed This Visit          Cardiac/Vascular    Mixed hyperlipidemia (Chronic)    Essential (primary) hypertension (Chronic)       GI    Generalized abdominal pain (Chronic)       Other    Encounter for annual wellness exam in Medicare patient - Primary       Health Maintenance Topics with due status: Not Due       Topic Last Completion Date    Colorectal Cancer Screening 08/18/2018    DEXA Scan 05/04/2022    TETANUS VACCINE 06/29/2023    LDCT Lung Screen 01/05/2024    Lipid Panel 06/10/2024    Mammogram 06/19/2024    Influenza Vaccine Not Due       Future Appointments   Date Time Provider Department Center   2/4/2025  9:20 AM Fadi Vasquez MD [facility]          Medicare Annual Wellness and Personalized Prevention Plan:   Fall Risk +  Home Safety + Hearing Impairment + Depression Screen + Cognitive Impairment Screen + Health Risk Assessment all reviewed.         Advance Care Planning     Date: 06/24/2024  Patient did not wish or was not able to name a surrogate decision maker or provide an Advance Care Plan.           Opioid Screening: Patient medication list reviewed, patient is not taking prescription opioids. Patient is not using additional opioids than prescribed. Patient is at low risk of substance abuse based on this opioid use history.        Signature:  Luis Schreiber MD  [facility]  [facility] [facility] [address]    Date of encounter: 6/24/24    Follow up in about 15 days (around 7/9/2024) for Medicare Wellness with labs. In addition to their scheduled follow up, the patient has also been instructed to follow up on as needed basis.

## 2024-06-24 NOTE — ASSESSMENT & PLAN NOTE
New onset of significant abdominal pain associated with shortness a breath   Further laboratory work amylase lipase CMP ordered as well as CT abdomen and pelvis   Revisit after exam

## 2024-07-05 ENCOUNTER — TELEPHONE (OUTPATIENT)
Dept: INTERNAL MEDICINE | Facility: CLINIC | Age: 74
End: 2024-07-05
Payer: MEDICARE

## 2024-07-05 DIAGNOSIS — R19.09 OTHER INTRA-ABDOMINAL AND PELVIC SWELLING, MASS AND LUMP: ICD-10-CM

## 2024-07-05 DIAGNOSIS — R93.5 ABNORMAL CT SCAN, PELVIS: ICD-10-CM

## 2024-07-05 DIAGNOSIS — R93.5 ABNORMAL CT OF THE ABDOMEN: ICD-10-CM

## 2024-07-05 DIAGNOSIS — N83.8 OVARIAN MASS: Primary | ICD-10-CM

## 2024-07-05 NOTE — TELEPHONE ENCOUNTER
Abnormal CT abdomen results concerning for ovarian neoplasms.  Results discussed with Dr. Angeles.    Attempted to call patient to discuss results, no answer.  Voicemail left with instructions to call clinic to discussed abnormal results.      Orders Placed This Encounter    US Pelvis Complete Non OB        Cancer Antigen 19-9

## 2024-07-05 NOTE — TELEPHONE ENCOUNTER
Spoke to patient discussed results of ovarian neoplasm suspected.  Agreeable to further workup.  Does have upcoming appointment with Dr. Angeles on Tuesday, encouraged to complete all workup prior to upcoming appointment.  Will have staff continue to facilitate urgent evaluation by gyn.

## 2024-07-08 ENCOUNTER — TELEPHONE (OUTPATIENT)
Dept: INTERNAL MEDICINE | Facility: CLINIC | Age: 74
End: 2024-07-08
Payer: MEDICARE

## 2024-07-08 NOTE — TELEPHONE ENCOUNTER
----- Message from Ml Padilla sent at 7/8/2024 12:21 PM CDT -----  [facility] called to state they do not accept patients insurance. They will not be able to schedule patient. Referral will have to be sent somewhere else.

## 2024-07-08 NOTE — TELEPHONE ENCOUNTER
Referral information sent to Dr Almaguer's office. They will review and see if they can get patient scheduled.

## 2024-07-09 ENCOUNTER — OFFICE VISIT (OUTPATIENT)
Dept: INTERNAL MEDICINE | Facility: CLINIC | Age: 74
End: 2024-07-09
Payer: MEDICARE

## 2024-07-09 VITALS
HEART RATE: 91 BPM | OXYGEN SATURATION: 100 % | WEIGHT: 113.19 LBS | DIASTOLIC BLOOD PRESSURE: 74 MMHG | SYSTOLIC BLOOD PRESSURE: 118 MMHG | RESPIRATION RATE: 16 BRPM | BODY MASS INDEX: 21.37 KG/M2 | HEIGHT: 61 IN

## 2024-07-09 DIAGNOSIS — R10.84 GENERALIZED ABDOMINAL PAIN: Primary | Chronic | ICD-10-CM

## 2024-07-09 DIAGNOSIS — D49.59 OVARIAN TUMOR: ICD-10-CM

## 2024-07-09 PROCEDURE — 1126F AMNT PAIN NOTED NONE PRSNT: CPT | Mod: CPTII,,, | Performed by: INTERNAL MEDICINE

## 2024-07-09 PROCEDURE — 3078F DIAST BP <80 MM HG: CPT | Mod: CPTII,,, | Performed by: INTERNAL MEDICINE

## 2024-07-09 PROCEDURE — 1101F PT FALLS ASSESS-DOCD LE1/YR: CPT | Mod: CPTII,,, | Performed by: INTERNAL MEDICINE

## 2024-07-09 PROCEDURE — 3074F SYST BP LT 130 MM HG: CPT | Mod: CPTII,,, | Performed by: INTERNAL MEDICINE

## 2024-07-09 PROCEDURE — 3288F FALL RISK ASSESSMENT DOCD: CPT | Mod: CPTII,,, | Performed by: INTERNAL MEDICINE

## 2024-07-09 PROCEDURE — 1160F RVW MEDS BY RX/DR IN RCRD: CPT | Mod: CPTII,,, | Performed by: INTERNAL MEDICINE

## 2024-07-09 PROCEDURE — 99214 OFFICE O/P EST MOD 30 MIN: CPT | Mod: ,,, | Performed by: INTERNAL MEDICINE

## 2024-07-09 PROCEDURE — 3008F BODY MASS INDEX DOCD: CPT | Mod: CPTII,,, | Performed by: INTERNAL MEDICINE

## 2024-07-09 PROCEDURE — 1159F MED LIST DOCD IN RCRD: CPT | Mod: CPTII,,, | Performed by: INTERNAL MEDICINE

## 2024-07-09 NOTE — ASSESSMENT & PLAN NOTE
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 Miguel SHAH may see her were sending the inflammation at this time   Time spent reviewing data explaining situation to patient talking to gynecologist greater than 30 minutes.

## 2024-07-09 NOTE — TELEPHONE ENCOUNTER
----- Message from Alicia Wallis sent at 7/9/2024 11:05 AM CDT -----  Type:  Needs Medical Advice    Who Called: pt  Symptoms (please be specific):    How long has patient had these symptoms:    Pharmacy name and phone #:    Would the patient rather a call back or a response via [facility]?  Best Call Back Number:  [phone]  Additional Information: pt called stated she was told by provider, ofc is having difficulties finding a   GYN, that accepts her insurance stated she knows her insurance will cover cost and should be accepted because she has an advantage plan, please follow up

## 2024-07-09 NOTE — TELEPHONE ENCOUNTER
Patient called to report a list of GYN's that her insurance accepts. I informed her that we sent the referral to Dr. Liza Marquez and awaiting to hear back if they will accept her as a new pt.

## 2024-07-09 NOTE — PROGRESS NOTES
Luis Schreiber MD        PATIENT NAME: Melina Lloyd  : 1950  DATE: 24  MRN: [id number]      Billing Provider: Luis Schreiber MD  Level of Service: SC OFFICE/OUTPT VISIT, EST, LEVL IV, 30-39 MIN  Patient PCP Information       Provider PCP Type    Luis Schreiber MD General            Reason for Visit / Chief Complaint: Results       Update PCP  Update Chief Complaint         History of Present Illness / Problem Focused Workflow     Melina Lloyd presents to the clinic with Results     Melina is here in follow-up abdominal pain issue  She has had her CT scan  Via phone call we informed her if she is got ovarian tumors when working to get her gynecologist   We are having difficulty that no one will take her insurance.  She does not have a gynecologist at this time.        Review of Systems   Review of Systems   Constitutional: Negative.    HENT: Negative.     Eyes: Negative.    Respiratory: Negative.     Cardiovascular: Negative.    Gastrointestinal: Negative.    Endocrine: Negative.    Genitourinary: Negative.    Musculoskeletal: Negative.    Integumentary:  Negative.   Neurological: Negative.    Psychiatric/Behavioral: Negative.          Medical / Social / Family History     Past Medical History:   Diagnosis Date    Essential (primary) hypertension     Low vitamin D level     Mixed hyperlipidemia        Past Surgical History:   Procedure Laterality Date     SECTION      HYSTERECTOMY         Social History  Ms. Lloyd  reports that she has been smoking cigarettes. She has a 40 pack-year smoking history. She has never used smokeless tobacco. She reports that she does not currently use alcohol. She reports that she does not use drugs.    Family History  Ms.'s Lloyd  family history includes Asthma in her father; Breast cancer in her maternal aunt; Hypertension in her mother; Liver cancer in her father; Lung cancer in her father.    Medications and Allergies      Medications  Outpatient Medications Marked as Taking for the 7/9/24 encounter (Office Visit) with Luis Angeles MD   Medication Sig Dispense Refill    amLODIPine (NORVASC) 5 MG tablet Take 1 tablet (5 mg total) by mouth once daily. 90 tablet 0    atorvastatin (LIPITOR) 40 MG tablet Take 1 tablet (40 mg total) by mouth once daily. 90 tablet 0    cholecalciferol, vitamin D3, (VITAMIN D3) 50 mcg (2,000 unit) Cap capsule Take 2,000 Units by mouth.      hydroCHLOROthiazide (HYDRODIURIL) 12.5 MG Tab Take 1 tablet (12.5 mg total) by mouth once daily. 90 tablet 3    metoprolol tartrate (LOPRESSOR) 25 MG tablet Take 1 tablet (25 mg total) by mouth once daily. 90 tablet 0       Allergies  Review of patient's allergies indicates:   Allergen Reactions    Shellfish containing products Anaphylaxis     Only crabs    Prednisone Itching       Physical Examination     Vitals:    07/09/24 0815   BP: 118/74   Pulse: 91   Resp: 16     Physical Exam  Constitutional:       Appearance: Normal appearance.   HENT:      Head: Normocephalic and atraumatic.      Right Ear: Tympanic membrane normal.      Left Ear: Tympanic membrane normal.      Nose: Nose normal.      Mouth/Throat:      Mouth: Mucous membranes are moist.   Eyes:      Extraocular Movements: Extraocular movements intact.      Pupils: Pupils are equal, round, and reactive to light.   Cardiovascular:      Rate and Rhythm: Normal rate and regular rhythm.      Pulses: Normal pulses.   Pulmonary:      Effort: Pulmonary effort is normal.      Breath sounds: Normal breath sounds.   Abdominal:      General: Abdomen is flat. Bowel sounds are normal.      Palpations: Abdomen is soft.   Musculoskeletal:         General: Normal range of motion.      Cervical back: Normal range of motion and neck supple.   Skin:     General: Skin is warm and dry.   Neurological:      General: No focal deficit present.      Mental Status: She is alert and oriented to person, place, and time.    Psychiatric:         Mood and Affect: Mood normal.         Behavior: Behavior normal.          Assessment and Plan (including Health Maintenance)      Problem List  Smart Sets  Document Outside HM   :    Plan:    ICD-10-CM ICD-9-CM   1. Generalized abdominal pain  R10.84 789.07   2. 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 Miguel SHAH may see her were sending the inflammation at this time   Time spent reviewing data explaining situation to patient talking to gynecologist greater than 30 minutes.            GI    Generalized abdominal pain - Primary (Chronic)     Stable with no increase in pain.                   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 Miguel SHAH may see her were sending the inflammation at this time   Time spent reviewing data explaining situation to patient talking to gynecologist greater than 30 minutes.            GI    Generalized abdominal pain - Primary (Chronic)    Current Assessment & Plan     Stable with no increase in pain.            Health Maintenance Topics with due status: Not Due       Topic Last Completion Date    Colorectal Cancer Screening 08/18/2018    DEXA Scan 05/04/2022    TETANUS VACCINE 06/29/2023    LDCT Lung Screen 01/05/2024    Lipid Panel 06/10/2024    Mammogram 06/19/2024    Influenza Vaccine  Not Due       Future Appointments   Date Time Provider Department Center   7/10/2024  9:45 AM [facility] US1 [address] LIMIT [facility]   2/4/2025  9:20 AM Fadi Vasquez MD [facility]      I spent a total of 45 minutes on the day of the visit.This includes face to face time and non-face to face time preparing to see the patient (eg, review of tests), obtaining and/or reviewing separately obtained history, documenting clinical information in the electronic or other health record, independently interpreting results and communicating results to the patient/family/caregiver, or care coordinator.      Signature:  Luis Schreiber MD  [facility]  [address] [facility]  [place] [address]    Date of encounter: 7/9/24

## 2024-07-10 ENCOUNTER — HOSPITAL ENCOUNTER (OUTPATIENT)
Dept: RADIOLOGY | Facility: HOSPITAL | Age: 74
Discharge: HOME | End: 2024-07-10
Payer: MEDICARE

## 2024-07-10 DIAGNOSIS — R19.09 OTHER INTRA-ABDOMINAL AND PELVIC SWELLING, MASS AND LUMP: ICD-10-CM

## 2024-07-10 DIAGNOSIS — R93.5 ABNORMAL CT OF THE ABDOMEN: ICD-10-CM

## 2024-07-10 PROCEDURE — 76856 US EXAM PELVIC COMPLETE: CPT | Mod: TC

## 2024-07-10 PROCEDURE — 76830 TRANSVAGINAL US NON-OB: CPT | Mod: TC

## 2024-07-12 ENCOUNTER — TELEPHONE (OUTPATIENT)
Dept: INTERNAL MEDICINE | Facility: CLINIC | Age: 74
End: 2024-07-12
Payer: MEDICARE

## 2024-07-12 NOTE — TELEPHONE ENCOUNTER
----- Message from Howard Aguilar sent at 7/12/2024  9:56 AM CDT -----  .Type:  Needs Medical Advice    Who Called: Dr. Jimmy Slaughter   Symptoms (please be specific):    How long has patient had these symptoms:    Pharmacy name and phone #:    Would the patient rather a call back or a response via [facility]?   Best Call Back Number: [phone]  Additional Information: She wanted to let the nurse know that the date of apt would be July 15th at 10:30 am. Please call back with any questions.

## 2024-07-12 NOTE — TELEPHONE ENCOUNTER
Patient called to inform us her appt with Dr. Liza Marquez is July 15.    Dr. Liza Marquez added to patient care teams

## 2024-07-15 ENCOUNTER — LAB VISIT (OUTPATIENT)
Dept: LAB | Facility: HOSPITAL | Age: 74
End: 2024-07-15
Attending: OBSTETRICS & GYNECOLOGY
Payer: MEDICARE

## 2024-07-15 DIAGNOSIS — C56.3 MALIGNANT NEOPLASM OF BOTH OVARIES: ICD-10-CM

## 2024-07-15 DIAGNOSIS — Z78.0 MENOPAUSE: ICD-10-CM

## 2024-07-15 DIAGNOSIS — D49.59 TUMORS OF BODY OF UTERUS, DELIVERED: ICD-10-CM

## 2024-07-15 DIAGNOSIS — O34.599 TUMORS OF BODY OF UTERUS, DELIVERED: ICD-10-CM

## 2024-07-15 DIAGNOSIS — N83.8 BROAD LIGAMENT LACERATION SYNDROME: Primary | ICD-10-CM

## 2024-07-15 LAB
AFP-TM SERPL-MCNC: 3.2 NG/ML
CEA SERPL-MCNC: 3.89 NG/ML (ref 0–3)

## 2024-07-15 PROCEDURE — 82378 CARCINOEMBRYONIC ANTIGEN: CPT

## 2024-07-15 PROCEDURE — 86305 HUMAN EPIDIDYMIS PROTEIN 4: CPT

## 2024-07-15 PROCEDURE — 86304 IMMUNOASSAY TUMOR CA 125: CPT

## 2024-07-15 PROCEDURE — 82105 ALPHA-FETOPROTEIN SERUM: CPT

## 2024-07-15 PROCEDURE — 36415 COLL VENOUS BLD VENIPUNCTURE: CPT

## 2024-07-15 PROCEDURE — 86336 INHIBIN A: CPT

## 2024-07-16 LAB
CANCER AG125 SERPL IA-ACNC: 6 U/ML
HE4 SERPL-SCNC: 92 PMOL/L
ROMA SCORE POSTMEN SERPL: 1.11
ROMA SCORE PREMEN SERPL: 2.45

## 2024-07-17 LAB
INHIBIN A SERPL-MCNC: 6.7 PG/ML
INHIBIN B SERPL IA-MCNC: 19 PG/ML

## 2024-08-29 DIAGNOSIS — Z01.818 PRE-OPERATIVE CLEARANCE: Primary | ICD-10-CM

## 2024-08-29 NOTE — PROGRESS NOTES
Patient is coming in on 9/4/24 for sx clearance. Placed labs, EKG, and CXR. Patient reports she will not be able to do them until Tuesday of next week. Advised patient that this is okay, as long as they get done.

## 2024-09-03 ENCOUNTER — HOSPITAL ENCOUNTER (OUTPATIENT)
Dept: RADIOLOGY | Facility: HOSPITAL | Age: 74
Discharge: HOME | End: 2024-09-03
Attending: INTERNAL MEDICINE
Payer: MEDICARE

## 2024-09-03 DIAGNOSIS — Z01.818 PRE-OPERATIVE CLEARANCE: ICD-10-CM

## 2024-09-03 PROCEDURE — 71046 X-RAY EXAM CHEST 2 VIEWS: CPT | Mod: TC

## 2024-09-04 ENCOUNTER — OFFICE VISIT (OUTPATIENT)
Dept: INTERNAL MEDICINE | Facility: CLINIC | Age: 74
End: 2024-09-04
Payer: MEDICARE

## 2024-09-04 VITALS
BODY MASS INDEX: 21.52 KG/M2 | HEART RATE: 93 BPM | HEIGHT: 61 IN | SYSTOLIC BLOOD PRESSURE: 118 MMHG | WEIGHT: 114 LBS | OXYGEN SATURATION: 98 % | DIASTOLIC BLOOD PRESSURE: 64 MMHG

## 2024-09-04 DIAGNOSIS — Z01.818 PREOP EXAM FOR INTERNAL MEDICINE: Primary | ICD-10-CM

## 2024-09-04 DIAGNOSIS — D49.59 OVARIAN TUMOR: Chronic | ICD-10-CM

## 2024-09-04 DIAGNOSIS — I10 ESSENTIAL (PRIMARY) HYPERTENSION: Chronic | ICD-10-CM

## 2024-09-04 DIAGNOSIS — H26.9 CATARACT OF LEFT EYE, UNSPECIFIED CATARACT TYPE: ICD-10-CM

## 2024-09-04 DIAGNOSIS — E78.2 MIXED HYPERLIPIDEMIA: Chronic | ICD-10-CM

## 2024-09-04 PROBLEM — R10.84 GENERALIZED ABDOMINAL PAIN: Chronic | Status: RESOLVED | Noted: 2024-06-24 | Resolved: 2024-09-04

## 2024-09-04 PROBLEM — R06.02 SOB (SHORTNESS OF BREATH): Status: RESOLVED | Noted: 2024-06-24 | Resolved: 2024-09-04

## 2024-09-04 PROCEDURE — 1101F PT FALLS ASSESS-DOCD LE1/YR: CPT | Mod: CPTII,,,

## 2024-09-04 PROCEDURE — 3288F FALL RISK ASSESSMENT DOCD: CPT | Mod: CPTII,,,

## 2024-09-04 PROCEDURE — 3008F BODY MASS INDEX DOCD: CPT | Mod: CPTII,,,

## 2024-09-04 PROCEDURE — 1160F RVW MEDS BY RX/DR IN RCRD: CPT | Mod: CPTII,,,

## 2024-09-04 PROCEDURE — 3078F DIAST BP <80 MM HG: CPT | Mod: CPTII,,,

## 2024-09-04 PROCEDURE — 99214 OFFICE O/P EST MOD 30 MIN: CPT | Mod: ,,,

## 2024-09-04 PROCEDURE — 3074F SYST BP LT 130 MM HG: CPT | Mod: CPTII,,,

## 2024-09-04 PROCEDURE — 1159F MED LIST DOCD IN RCRD: CPT | Mod: CPTII,,,

## 2024-09-04 PROCEDURE — 1126F AMNT PAIN NOTED NONE PRSNT: CPT | Mod: CPTII,,,

## 2024-09-04 RX ORDER — METOPROLOL SUCCINATE 25 MG/1
25 TABLET, EXTENDED RELEASE ORAL DAILY
COMMUNITY
End: 2024-09-04

## 2024-09-04 NOTE — LETTER
September 4, 2024    MD Nabil Tellez FNP  [facility]  [address] [place] [id number]  Phone: [phone]  Fax: [phone]       Patient: Melina Lloyd   YOB: 1950  Date of Visit: 09/04/2024    Dear    Roxanne Lloyd  was at [facility] on 09/04/2024. The patient is cleared for their procedure  with no restrictions. If you have any questions or concerns, or if I can be of further assistance, please do not hesitate to contact me.    Sincerely,    LAUREL Lopez

## 2024-09-04 NOTE — ASSESSMENT & PLAN NOTE
-Patient feeling generally well today  -Denies chest pain or shortness of breath  -Most recent labs reviewed and essentially normal  -EKG normal sinus rhythm  -Chest x-ray without acute cardiopulmonary process  -Able to perform ADLs and IADLs independently  -Mets functional score >4  -Not currently on any DOACs  -Cleared from Internal Medicine standpoint for upcoming procedure

## 2024-09-04 NOTE — ASSESSMENT & PLAN NOTE
"[July 2024] CT Abdomen Pelvis  CT Abdomen Pelvis:   "Bilateral solid and heterogeneous pelvic masses which appear to be ovarian in etiology, larger and more heterogeneous on the right. Pelvic ultrasound could provide further characterization. These are likely bilateral ovarian neoplasms."   "

## 2024-09-04 NOTE — PROGRESS NOTES
Patient ID: Melina Lloyd is a 73 y.o. female.    Chief Complaint: surgery clearance (Surgery clearance- no complaints. )    Melina Lloyd is a 73 y.o. female, known to Dr Angeles, presents today for a preoperative visit.  Medical comorbidities include HTN, HLD, and vitamin-D deficiency  Patient previously seen with abdominal discomfort.  Subsequently had CT abdomen pelvis (2024) indicating Bilateral solid and heterogeneous pelvic masses which appear to be ovarian in etiology, suspicious for bilateral ovarian neoplasms.  Subsequently evaluated by GYN, now scheduled for Laparoscopic Salpingo-Oophorectomy with Gynecologic-Oncology.  Tentatively scheduled 10/21/2024.  Also with left eye cataract, established with [facility].  Scheduled for outpatient cataract correction tentatively 2024.   Today reports feeling generally well.  No chest pain, palpitations, or shortness a breath.  Denies recent fevers, chills, or recent infections.  Otherwise without acute complaints for today's visit.    Wellness:2024       MEDICAL HISTORY:    Past Medical History:   Diagnosis Date    Essential (primary) hypertension     Low vitamin D level     Mixed hyperlipidemia       Past Surgical History:   Procedure Laterality Date     SECTION      HYSTERECTOMY        Social History     Tobacco Use    Smoking status: Every Day     Current packs/day: 1.00     Average packs/day: 1 pack/day for 40.0 years (40.0 ttl pk-yrs)     Types: Cigarettes    Smokeless tobacco: Never   Substance Use Topics    Alcohol use: Not Currently    Drug use: Never          Health Maintenance Due   Topic Date Due    RSV Vaccine (Age 60+ and Pregnant patients) (1 - 1-dose 60+ series) Never done    Influenza Vaccine (1) Never done    COVID-19 Vaccine (3 - 2023-24 season) 2024          Patient Care Team:  Luis Angeles MD as PCP - General (Internal Medicine)  Liza Marquez MD (Gynecology)      Review of Systems  "  Constitutional:  Negative for fatigue and fever.   HENT:  Negative for congestion, rhinorrhea, sore throat and trouble swallowing.    Eyes:  Negative for redness and visual disturbance.   Respiratory:  Negative for cough, chest tightness and shortness of breath.    Cardiovascular:  Negative for chest pain and palpitations.   Gastrointestinal:  Negative for abdominal pain, constipation, diarrhea, nausea and vomiting.   Genitourinary:  Negative for dysuria, flank pain, frequency and urgency.   Musculoskeletal:  Negative for arthralgias, gait problem and myalgias.   Skin:  Negative for rash and wound.   Neurological:  Negative for facial asymmetry, speech difficulty, weakness and headaches.   All other systems reviewed and are negative.      Objective:   /64 (BP Location: Left arm)   Pulse 93   Ht 5' 1" (1.549 m)   Wt 51.7 kg (114 lb)   SpO2 98%   BMI 21.54 kg/m²      Physical Exam  Constitutional:       General: She is not in acute distress.     Appearance: Normal appearance.   HENT:      Right Ear: Tympanic membrane, ear canal and external ear normal.      Left Ear: Tympanic membrane, ear canal and external ear normal.      Nose: Nose normal.      Mouth/Throat:      Mouth: Mucous membranes are moist.      Pharynx: Oropharynx is clear.   Eyes:      Extraocular Movements: Extraocular movements intact.      Conjunctiva/sclera: Conjunctivae normal.      Pupils: Pupils are equal, round, and reactive to light.   Cardiovascular:      Rate and Rhythm: Normal rate and regular rhythm.      Pulses: Normal pulses.      Heart sounds: Normal heart sounds. No murmur heard.     No gallop.   Pulmonary:      Effort: Pulmonary effort is normal.      Breath sounds: Normal breath sounds. No wheezing.   Abdominal:      General: Bowel sounds are normal. There is no distension.      Palpations: Abdomen is soft. There is no mass.      Tenderness: There is no abdominal tenderness. There is no guarding.   Musculoskeletal:         " General: Normal range of motion.   Skin:     General: Skin is warm and dry.   Neurological:      Mental Status: She is alert. Mental status is at baseline.      Sensory: No sensory deficit.      Motor: No weakness.           Assessment:       ICD-10-CM ICD-9-CM   1. Preop exam for internal medicine  Z01.818 V72.83   2. Ovarian tumor  D49.59 239.5   3. Cataract of left eye, unspecified cataract type  H26.9 366.9   4. Essential (primary) hypertension  I10 401.9   5. Mixed hyperlipidemia  E78.2 272.2        Plan:     Problem List Items Addressed This Visit          Ophtho    Cataract of left eye     -established with ophthalmology   -upcoming left eye cataract correction, follow            Cardiac/Vascular    Mixed hyperlipidemia (Chronic)     -currently on atorvastatin 40 mg daily, continue   -low-cholesterol diet         Essential (primary) hypertension (Chronic)     -stable   -currently on metoprolol tartrate 25 mg q.d., HCTZ 12.5 mg q.d., amlodipine 5 mg q.d., continue   -low-sodium diet            Oncology    Ovarian tumor (Chronic)     -established with gyn and gynecologic Oncology  -upcoming salpingo-oophorectomy with biopsy, follow            Other    Preop exam for internal medicine - Primary     -Patient feeling generally well today  -Denies chest pain or shortness of breath  -Most recent labs reviewed and essentially normal  -EKG normal sinus rhythm  -Chest x-ray without acute cardiopulmonary process  -Able to perform ADLs and IADLs independently  -Mets functional score >4  -Not currently on any DOACs  -Cleared from Internal Medicine standpoint for upcoming procedure                Follow up for Previously scheduled and PRN if need.   -plan specifics discussed above          Medication List with Changes/Refills   Current Medications    AMLODIPINE (NORVASC) 5 MG TABLET    Take 1 tablet (5 mg total) by mouth once daily.    ATORVASTATIN (LIPITOR) 40 MG TABLET    Take 1 tablet (40 mg total) by mouth once daily.     CHOLECALCIFEROL, VITAMIN D3, (VITAMIN D3) 50 MCG (2,000 UNIT) CAP CAPSULE    Take 2,000 Units by mouth.    HYDROCHLOROTHIAZIDE (HYDRODIURIL) 12.5 MG TAB    Take 1 tablet (12.5 mg total) by mouth once daily.    METOPROLOL TARTRATE (LOPRESSOR) 25 MG TABLET    Take 1 tablet (25 mg total) by mouth once daily.   Discontinued Medications    METOPROLOL SUCCINATE (TOPROL-XL) 25 MG 24 HR TABLET    Take 25 mg by mouth once daily.

## 2024-09-04 NOTE — ASSESSMENT & PLAN NOTE
-established with gyn and gynecologic Oncology  -upcoming salpingo-oophorectomy with biopsy, follow

## 2024-09-04 NOTE — ASSESSMENT & PLAN NOTE
-stable   -currently on metoprolol tartrate 25 mg q.d., HCTZ 12.5 mg q.d., amlodipine 5 mg q.d., continue   -low-sodium diet

## 2024-10-01 DIAGNOSIS — I10 ESSENTIAL (PRIMARY) HYPERTENSION: Chronic | ICD-10-CM

## 2024-10-01 DIAGNOSIS — E78.2 MIXED HYPERLIPIDEMIA: Chronic | ICD-10-CM

## 2024-10-02 RX ORDER — METOPROLOL TARTRATE 25 MG/1
25 TABLET, FILM COATED ORAL
Qty: 90 TABLET | Refills: 0 | Status: SHIPPED | OUTPATIENT
Start: 2024-10-02

## 2024-10-02 RX ORDER — ATORVASTATIN CALCIUM 40 MG/1
40 TABLET, FILM COATED ORAL
Qty: 90 TABLET | Refills: 0 | Status: SHIPPED | OUTPATIENT
Start: 2024-10-02

## 2024-10-22 ENCOUNTER — HOSPITAL ENCOUNTER (EMERGENCY)
Facility: HOSPITAL | Age: 74
Discharge: LEFT BEFORE DISCHARGE | End: 2024-10-23
Payer: MEDICARE

## 2024-10-22 VITALS
SYSTOLIC BLOOD PRESSURE: 130 MMHG | OXYGEN SATURATION: 98 % | HEART RATE: 81 BPM | WEIGHT: 112 LBS | RESPIRATION RATE: 18 BRPM | BODY MASS INDEX: 21.99 KG/M2 | TEMPERATURE: 98 F | DIASTOLIC BLOOD PRESSURE: 71 MMHG | HEIGHT: 60 IN

## 2024-10-22 PROCEDURE — 99900041 HC LEFT WITHOUT BEING SEEN- EMERGENCY

## 2024-10-25 ENCOUNTER — TELEPHONE (OUTPATIENT)
Dept: INTERNAL MEDICINE | Facility: CLINIC | Age: 74
End: 2024-10-25
Payer: MEDICARE

## 2024-10-25 DIAGNOSIS — I10 ESSENTIAL (PRIMARY) HYPERTENSION: Primary | ICD-10-CM

## 2024-11-04 ENCOUNTER — TELEPHONE (OUTPATIENT)
Dept: INTERNAL MEDICINE | Facility: CLINIC | Age: 74
End: 2024-11-04
Payer: MEDICARE

## 2024-11-11 ENCOUNTER — LAB VISIT (OUTPATIENT)
Dept: LAB | Facility: HOSPITAL | Age: 74
End: 2024-11-11
Attending: INTERNAL MEDICINE
Payer: MEDICARE

## 2024-11-11 DIAGNOSIS — I10 ESSENTIAL (PRIMARY) HYPERTENSION: ICD-10-CM

## 2024-11-11 LAB
ALBUMIN SERPL-MCNC: 3.9 G/DL (ref 3.4–4.8)
ALBUMIN/GLOB SERPL: 1.1 RATIO (ref 1.1–2)
ALP SERPL-CCNC: 120 UNIT/L (ref 40–150)
ALT SERPL-CCNC: 19 UNIT/L (ref 0–55)
ANION GAP SERPL CALC-SCNC: 7 MEQ/L
AST SERPL-CCNC: 22 UNIT/L (ref 5–34)
BASOPHILS # BLD AUTO: 0.03 X10(3)/MCL
BASOPHILS NFR BLD AUTO: 0.4 %
BILIRUB SERPL-MCNC: 0.4 MG/DL
BUN SERPL-MCNC: 9.1 MG/DL (ref 9.8–20.1)
CALCIUM SERPL-MCNC: 10.1 MG/DL (ref 8.4–10.2)
CHLORIDE SERPL-SCNC: 105 MMOL/L (ref 98–107)
CO2 SERPL-SCNC: 27 MMOL/L (ref 23–31)
CREAT SERPL-MCNC: 0.82 MG/DL (ref 0.55–1.02)
CREAT/UREA NIT SERPL: 11
EOSINOPHIL # BLD AUTO: 0.3 X10(3)/MCL (ref 0–0.9)
EOSINOPHIL NFR BLD AUTO: 4 %
ERYTHROCYTE [DISTWIDTH] IN BLOOD BY AUTOMATED COUNT: 14.2 % (ref 11.5–17)
GFR SERPLBLD CREATININE-BSD FMLA CKD-EPI: >60 ML/MIN/1.73/M2
GLOBULIN SER-MCNC: 3.7 GM/DL (ref 2.4–3.5)
GLUCOSE SERPL-MCNC: 94 MG/DL (ref 82–115)
HCT VFR BLD AUTO: 35.7 % (ref 37–47)
HGB BLD-MCNC: 11.5 G/DL (ref 12–16)
IMM GRANULOCYTES # BLD AUTO: 0.02 X10(3)/MCL (ref 0–0.04)
IMM GRANULOCYTES NFR BLD AUTO: 0.3 %
LYMPHOCYTES # BLD AUTO: 2.1 X10(3)/MCL (ref 0.6–4.6)
LYMPHOCYTES NFR BLD AUTO: 28 %
MCH RBC QN AUTO: 31.7 PG (ref 27–31)
MCHC RBC AUTO-ENTMCNC: 32.2 G/DL (ref 33–36)
MCV RBC AUTO: 98.3 FL (ref 80–94)
MONOCYTES # BLD AUTO: 0.35 X10(3)/MCL (ref 0.1–1.3)
MONOCYTES NFR BLD AUTO: 4.7 %
NEUTROPHILS # BLD AUTO: 4.7 X10(3)/MCL (ref 2.1–9.2)
NEUTROPHILS NFR BLD AUTO: 62.6 %
NRBC BLD AUTO-RTO: 0 %
PLATELET # BLD AUTO: 418 X10(3)/MCL (ref 130–400)
PMV BLD AUTO: 9.4 FL (ref 7.4–10.4)
POTASSIUM SERPL-SCNC: 4.9 MMOL/L (ref 3.5–5.1)
PROT SERPL-MCNC: 7.6 GM/DL (ref 5.8–7.6)
RBC # BLD AUTO: 3.63 X10(6)/MCL (ref 4.2–5.4)
SODIUM SERPL-SCNC: 139 MMOL/L (ref 136–145)
WBC # BLD AUTO: 7.5 X10(3)/MCL (ref 4.5–11.5)

## 2024-11-11 PROCEDURE — 36415 COLL VENOUS BLD VENIPUNCTURE: CPT

## 2024-11-11 PROCEDURE — 85025 COMPLETE CBC W/AUTO DIFF WBC: CPT

## 2024-11-11 PROCEDURE — 80053 COMPREHEN METABOLIC PANEL: CPT

## 2024-11-12 ENCOUNTER — OFFICE VISIT (OUTPATIENT)
Dept: INTERNAL MEDICINE | Facility: CLINIC | Age: 74
End: 2024-11-12
Payer: MEDICARE

## 2024-11-12 VITALS
HEART RATE: 84 BPM | OXYGEN SATURATION: 98 % | SYSTOLIC BLOOD PRESSURE: 118 MMHG | DIASTOLIC BLOOD PRESSURE: 68 MMHG | HEIGHT: 60 IN | BODY MASS INDEX: 22.19 KG/M2 | WEIGHT: 113 LBS

## 2024-11-12 DIAGNOSIS — Z13.83 SCREENING FOR CARDIOVASCULAR, RESPIRATORY, AND GENITOURINARY DISEASES: ICD-10-CM

## 2024-11-12 DIAGNOSIS — Z28.21 IMMUNIZATION DECLINED: ICD-10-CM

## 2024-11-12 DIAGNOSIS — Z13.6 SCREENING FOR CARDIOVASCULAR, RESPIRATORY, AND GENITOURINARY DISEASES: ICD-10-CM

## 2024-11-12 DIAGNOSIS — Z13.29 SCREENING FOR ENDOCRINE, NUTRITIONAL, METABOLIC AND IMMUNITY DISORDER: ICD-10-CM

## 2024-11-12 DIAGNOSIS — E78.2 MIXED HYPERLIPIDEMIA: Chronic | ICD-10-CM

## 2024-11-12 DIAGNOSIS — Z13.228 SCREENING FOR ENDOCRINE, NUTRITIONAL, METABOLIC AND IMMUNITY DISORDER: ICD-10-CM

## 2024-11-12 DIAGNOSIS — R79.89 LOW VITAMIN D LEVEL: Chronic | ICD-10-CM

## 2024-11-12 DIAGNOSIS — Z79.899 ENCOUNTER FOR LONG-TERM (CURRENT) USE OF MEDICATIONS: ICD-10-CM

## 2024-11-12 DIAGNOSIS — I10 ESSENTIAL (PRIMARY) HYPERTENSION: Primary | Chronic | ICD-10-CM

## 2024-11-12 DIAGNOSIS — Z13.21 SCREENING FOR ENDOCRINE, NUTRITIONAL, METABOLIC AND IMMUNITY DISORDER: ICD-10-CM

## 2024-11-12 DIAGNOSIS — D49.59 OVARIAN TUMOR: Chronic | ICD-10-CM

## 2024-11-12 DIAGNOSIS — Z13.0 SCREENING FOR ENDOCRINE, NUTRITIONAL, METABOLIC AND IMMUNITY DISORDER: ICD-10-CM

## 2024-11-12 DIAGNOSIS — Z13.89 SCREENING FOR CARDIOVASCULAR, RESPIRATORY, AND GENITOURINARY DISEASES: ICD-10-CM

## 2024-11-12 PROBLEM — Z01.818 PREOP EXAM FOR INTERNAL MEDICINE: Status: RESOLVED | Noted: 2024-06-21 | Resolved: 2024-11-12

## 2024-11-12 PROCEDURE — 3078F DIAST BP <80 MM HG: CPT | Mod: CPTII,,,

## 2024-11-12 PROCEDURE — 1101F PT FALLS ASSESS-DOCD LE1/YR: CPT | Mod: CPTII,,,

## 2024-11-12 PROCEDURE — 3074F SYST BP LT 130 MM HG: CPT | Mod: CPTII,,,

## 2024-11-12 PROCEDURE — 3288F FALL RISK ASSESSMENT DOCD: CPT | Mod: CPTII,,,

## 2024-11-12 PROCEDURE — 3008F BODY MASS INDEX DOCD: CPT | Mod: CPTII,,,

## 2024-11-12 PROCEDURE — 1160F RVW MEDS BY RX/DR IN RCRD: CPT | Mod: CPTII,,,

## 2024-11-12 PROCEDURE — 1126F AMNT PAIN NOTED NONE PRSNT: CPT | Mod: CPTII,,,

## 2024-11-12 PROCEDURE — 99214 OFFICE O/P EST MOD 30 MIN: CPT | Mod: ,,,

## 2024-11-12 PROCEDURE — 1159F MED LIST DOCD IN RCRD: CPT | Mod: CPTII,,,

## 2024-11-12 NOTE — PROGRESS NOTES
Patient ID: Melina Lloyd is a 74 y.o. female.    Chief Complaint: Follow-up (3 month follow- no complaints or concerns today. )    Melina Lloyd is a 74 y.o. female, known to Dr Angeles, presents today for a routine follow-up visit.  Medical comorbidities include HTN, HLD, and vitamin-D deficiency.  Patient recently had a Laparoscopic Salpingo-Oophorectomy with Gynecologic-Oncology for an ovarian mass.  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  Discussed requesting pathology reports, for which patient was in agreeance to.  Denies any current abdominal pain/discomfort, hematuria, or change in bowel or bladder.  Most recent labs reviewed in generally stable, does have mildly elevated platelet count s/p stopping baby aspirin.  Also with mild anemia, reports no longer taking centrum silver.  Age-appropriate immunizations discussed, patient not interested in obtaining any further immunizations at this time.  Otherwise feeling generally well without acute concerns.     Wellness:2024         MEDICAL HISTORY:    Past Medical History:   Diagnosis Date    Essential (primary) hypertension     Low vitamin D level     Mixed hyperlipidemia       Past Surgical History:   Procedure Laterality Date     SECTION      HYSTERECTOMY        Social History     Tobacco Use    Smoking status: Every Day     Current packs/day: 1.00     Average packs/day: 1 pack/day for 40.0 years (40.0 ttl pk-yrs)     Types: Cigarettes    Smokeless tobacco: Never   Substance Use Topics    Alcohol use: Not Currently    Drug use: Never          There are no preventive care reminders to display for this patient.         Patient Care Team:  Luis Angeles MD as PCP - General (Internal Medicine)  Liza Marquez MD (Gynecology)      Review of Systems   Constitutional:  Negative for fatigue and fever.   HENT:  Negative for congestion, rhinorrhea, sore throat and trouble swallowing.    Eyes:  Negative for redness and visual  disturbance.   Respiratory:  Negative for cough, chest tightness and shortness of breath.    Cardiovascular:  Negative for chest pain and palpitations.   Gastrointestinal:  Negative for abdominal pain, constipation, diarrhea, nausea and vomiting.   Genitourinary:  Negative for dysuria, flank pain, frequency and urgency.   Musculoskeletal:  Negative for arthralgias, gait problem and myalgias.   Skin:  Negative for rash and wound.   Neurological:  Negative for facial asymmetry, speech difficulty, weakness and headaches.   All other systems reviewed and are negative.      Objective:   /68 (BP Location: Left arm, Patient Position: Sitting)   Pulse 84   Ht 5' (1.524 m)   Wt 51.3 kg (113 lb)   SpO2 98%   BMI 22.07 kg/m²      Physical Exam  Constitutional:       General: She is not in acute distress.     Appearance: Normal appearance.   HENT:      Right Ear: Tympanic membrane, ear canal and external ear normal.      Left Ear: Tympanic membrane, ear canal and external ear normal.      Nose: Nose normal.      Mouth/Throat:      Mouth: Mucous membranes are moist.      Pharynx: Oropharynx is clear.   Eyes:      Extraocular Movements: Extraocular movements intact.      Conjunctiva/sclera: Conjunctivae normal.      Pupils: Pupils are equal, round, and reactive to light.   Cardiovascular:      Rate and Rhythm: Normal rate and regular rhythm.      Pulses: Normal pulses.      Heart sounds: Normal heart sounds. No murmur heard.     No gallop.   Pulmonary:      Effort: Pulmonary effort is normal.      Breath sounds: Normal breath sounds. No wheezing.   Abdominal:      General: Bowel sounds are normal. There is no distension.      Palpations: Abdomen is soft. There is no mass.      Tenderness: There is no abdominal tenderness. There is no guarding.   Musculoskeletal:         General: Normal range of motion.   Skin:     General: Skin is warm and dry.   Neurological:      Mental Status: She is alert. Mental status is at  baseline.      Sensory: No sensory deficit.      Motor: No weakness.           Assessment:       ICD-10-CM ICD-9-CM   1. Essential (primary) hypertension  I10 401.9   2. Mixed hyperlipidemia  E78.2 272.2   3. Low vitamin D level  R79.89 790.6   4. Ovarian tumor  D49.59 239.5   5. Immunization declined  Z28.21 V64.06   6. Encounter for long-term (current) use of medications  Z79.899 V58.69   7. Screening for endocrine, nutritional, metabolic and immunity disorder  Z13.29 V77.99    Z13.21     Z13.228     Z13.0    8. Screening for cardiovascular, respiratory, and genitourinary diseases  Z13.6 V81.2    Z13.89 V81.6    Z13.83 V81.4        Plan:     Problem List Items Addressed This Visit          Psychiatric    Encounter for long-term (current) use of medications     -order screening/baseline labs for upcoming visit           Relevant Orders    TSH    Lipid Panel    Comprehensive Metabolic Panel    CBC Auto Differential    Vitamin D       Cardiac/Vascular    Mixed hyperlipidemia (Chronic)     -currently on atorvastatin 40 mg daily, continue   -low-cholesterol diet            Relevant Orders    TSH    Lipid Panel    Comprehensive Metabolic Panel    CBC Auto Differential    Vitamin D    Essential (primary) hypertension - Primary (Chronic)     -stable   -currently on metoprolol tartrate 25 mg q.d., HCTZ 12.5 mg q.d., amlodipine 5 mg q.d., continue   -low-sodium diet            Relevant Orders    TSH    Lipid Panel    Comprehensive Metabolic Panel    CBC Auto Differential    Vitamin D       ID    Immunization declined (Chronic)     -immunizations reviewed and several vaccines noted due  -immunizations discussed.  -patient declined immunizations despite verbal Education              Oncology    Ovarian tumor (Chronic)     -s/p salpingo-oophorectomy with biopsy  - patient reports pathology report open dictation all good, request pathology reports         Relevant Orders    TSH    Lipid Panel    Comprehensive Metabolic Panel     CBC Auto Differential    Vitamin D       Endocrine    Low vitamin D level (Chronic)     -currently on vitamin-D 2000 units daily, continue   -vitamin-D level for next visit         Relevant Orders    TSH    Lipid Panel    Comprehensive Metabolic Panel    CBC Auto Differential    Vitamin D    Screening for endocrine, nutritional, metabolic and immunity disorder     -order screening/baseline labs for upcoming visit           Relevant Orders    TSH    Lipid Panel    Comprehensive Metabolic Panel    CBC Auto Differential    Vitamin D       Other    Screening for cardiovascular, respiratory, and genitourinary diseases     -order screening/baseline labs for upcoming visit           Relevant Orders    TSH    Lipid Panel    Comprehensive Metabolic Panel    CBC Auto Differential    Vitamin D          Follow up in about 7 months (around 6/25/2025) for with Dr. Angeles, Wellness with labs prior to visit.   -plan specifics discussed above    Orders Placed This Encounter    TSH    Lipid Panel    Comprehensive Metabolic Panel    CBC Auto Differential    Vitamin D        Medication List with Changes/Refills   Current Medications    AMLODIPINE (NORVASC) 5 MG TABLET    Take 1 tablet (5 mg total) by mouth once daily.    ATORVASTATIN (LIPITOR) 40 MG TABLET    Take 1 tablet by mouth once daily    CHOLECALCIFEROL, VITAMIN D3, (VITAMIN D3) 50 MCG (2,000 UNIT) CAP CAPSULE    Take 2,000 Units by mouth.    HYDROCHLOROTHIAZIDE (HYDRODIURIL) 12.5 MG TAB    Take 1 tablet (12.5 mg total) by mouth once daily.    METOPROLOL TARTRATE (LOPRESSOR) 25 MG TABLET    Take 1 tablet by mouth once daily

## 2024-11-12 NOTE — ASSESSMENT & PLAN NOTE
-immunizations reviewed and several vaccines noted due  -immunizations discussed.  -patient declined immunizations despite verbal Education

## 2024-11-12 NOTE — ASSESSMENT & PLAN NOTE
-s/p salpingo-oophorectomy with biopsy  - patient reports pathology report open dictation all good, request pathology reports

## 2024-12-30 DIAGNOSIS — I10 ESSENTIAL (PRIMARY) HYPERTENSION: Chronic | ICD-10-CM

## 2024-12-30 DIAGNOSIS — E78.2 MIXED HYPERLIPIDEMIA: Chronic | ICD-10-CM

## 2024-12-30 RX ORDER — METOPROLOL TARTRATE 25 MG/1
25 TABLET, FILM COATED ORAL
Qty: 90 TABLET | Refills: 0 | Status: SHIPPED | OUTPATIENT
Start: 2024-12-30

## 2024-12-30 RX ORDER — ATORVASTATIN CALCIUM 40 MG/1
40 TABLET, FILM COATED ORAL
Qty: 90 TABLET | Refills: 0 | Status: SHIPPED | OUTPATIENT
Start: 2024-12-30

## 2025-01-08 ENCOUNTER — HOSPITAL ENCOUNTER (OUTPATIENT)
Dept: RADIOLOGY | Facility: HOSPITAL | Age: 75
Discharge: HOME | End: 2025-01-08
Attending: INTERNAL MEDICINE
Payer: MEDICARE

## 2025-01-08 DIAGNOSIS — Z87.891 PERSONAL HISTORY OF SMOKING: ICD-10-CM

## 2025-01-08 PROCEDURE — 71271 CT THORAX LUNG CANCER SCR C-: CPT | Mod: TC

## 2025-01-10 ENCOUNTER — TELEPHONE (OUTPATIENT)
Dept: RADIOLOGY | Facility: HOSPITAL | Age: 75
End: 2025-01-10
Payer: MEDICARE

## 2025-01-10 NOTE — TELEPHONE ENCOUNTER
----- Message from LUKE Cullen sent at 1/10/2025  2:03 PM CST -----  Regarding: RE: 3  1/09/25-LDCT Lung rad 1  ----- Message -----  From: Alyse Miller RN  Sent: 1/10/2025  12:00 AM CST  To: Alyse Miller RN  Subject: RE: 3                                            1/9/25-report in progress  ----- Message -----  From: Alyse Miller RN  Sent: 1/9/2025  12:00 AM CST  To: Alyse Miller RN  Subject: RE: 3                                            LDCT 1/8/25  ----- Message -----  From: Alyse Miller RN  Sent: 12/30/2024  12:00 AM CST  To: Alyse Miller RN  Subject: RE: 3                                            Due 1/6/25  Continue annual low-dose screening CT chest.  We will give her the option of continuing to follow here are continue with her primary care physician Dr. Angeles (patient would like to continue here so we will set her up in 1 year's time with low-dose screening.   ----- Message -----  From: Alyse Miller RN  Sent: 1/8/2024  12:00 AM CST  To: Alyse Miller RN  Subject: RE: 3                                            CT chest 1/3/24  ----- Message -----  From: Alyse Miller RN  Sent: 7/24/2023  12:00 AM CDT  To: Alyse Miller RN  Subject: RE: 3                                            Appt with pulmonary 7/17/23  ----- Message -----  From: Alyse Miller RN  Sent: 7/3/2023   8:07 AM CDT  To: Alyse Miller RN  Subject: 3                                                3

## 2025-01-14 PROBLEM — Z87.891 PERSONAL HISTORY OF NICOTINE DEPENDENCE: Status: ACTIVE | Noted: 2025-01-14

## 2025-03-31 DIAGNOSIS — E78.2 MIXED HYPERLIPIDEMIA: Chronic | ICD-10-CM

## 2025-03-31 DIAGNOSIS — I10 ESSENTIAL (PRIMARY) HYPERTENSION: Chronic | ICD-10-CM

## 2025-03-31 RX ORDER — METOPROLOL TARTRATE 25 MG/1
25 TABLET, FILM COATED ORAL
Qty: 90 TABLET | Refills: 0 | Status: SHIPPED | OUTPATIENT
Start: 2025-03-31

## 2025-03-31 RX ORDER — ATORVASTATIN CALCIUM 40 MG/1
40 TABLET, FILM COATED ORAL
Qty: 90 TABLET | Refills: 0 | Status: SHIPPED | OUTPATIENT
Start: 2025-03-31

## 2025-06-03 ENCOUNTER — TELEPHONE (OUTPATIENT)
Dept: INTERNAL MEDICINE | Facility: CLINIC | Age: 75
End: 2025-06-03
Payer: MEDICARE

## 2025-06-03 DIAGNOSIS — Z78.0 POST-MENOPAUSAL: ICD-10-CM

## 2025-06-03 DIAGNOSIS — Z12.31 ENCOUNTER FOR SCREENING MAMMOGRAM FOR BREAST CANCER: Primary | ICD-10-CM

## 2025-06-17 ENCOUNTER — HOSPITAL ENCOUNTER (OUTPATIENT)
Dept: RADIOLOGY | Facility: HOSPITAL | Age: 75
Discharge: HOME | End: 2025-06-17
Attending: INTERNAL MEDICINE
Payer: MEDICARE

## 2025-06-17 ENCOUNTER — RESULTS FOLLOW-UP (OUTPATIENT)
Dept: INTERNAL MEDICINE | Facility: CLINIC | Age: 75
End: 2025-06-17

## 2025-06-17 DIAGNOSIS — Z78.0 POST-MENOPAUSAL: ICD-10-CM

## 2025-06-17 DIAGNOSIS — Z12.31 ENCOUNTER FOR SCREENING MAMMOGRAM FOR BREAST CANCER: ICD-10-CM

## 2025-06-17 PROCEDURE — 77063 BREAST TOMOSYNTHESIS BI: CPT | Mod: 26,,, | Performed by: RADIOLOGY

## 2025-06-17 PROCEDURE — 77081 DXA BONE DENSITY APPENDICULR: CPT | Mod: TC

## 2025-06-17 PROCEDURE — 77067 SCR MAMMO BI INCL CAD: CPT | Mod: 26,,, | Performed by: RADIOLOGY

## 2025-06-17 PROCEDURE — 77067 SCR MAMMO BI INCL CAD: CPT | Mod: TC

## 2025-06-17 PROCEDURE — 77080 DXA BONE DENSITY AXIAL: CPT | Mod: XU,TC

## 2025-06-18 ENCOUNTER — RESULTS FOLLOW-UP (OUTPATIENT)
Dept: INTERNAL MEDICINE | Facility: CLINIC | Age: 75
End: 2025-06-18
Payer: MEDICARE

## 2025-06-18 ENCOUNTER — TELEPHONE (OUTPATIENT)
Dept: INTERNAL MEDICINE | Facility: CLINIC | Age: 75
End: 2025-06-18
Payer: MEDICARE

## 2025-06-18 DIAGNOSIS — M81.0 AGE-RELATED OSTEOPOROSIS WITHOUT CURRENT PATHOLOGICAL FRACTURE: Primary | ICD-10-CM

## 2025-06-18 NOTE — TELEPHONE ENCOUNTER
----- Message from LAUREL Cai sent at 6/18/2025  7:45 AM CDT -----  Please inform patient I have reviewed her bone density, which indicates osteoporosis. Recommendations are for pharmacologic treatment with Prolia injections every 6 months or Fosamax oral medication   once a week on an empty stomach, remaining upright for 1 hour afterwards.  Also to incorporate, if not already on, over the counter supplementation with calcium 500 mg to 600 mg twice daily and   vitamin D 800 units twice daily.  As well as incorporate some form of routine weightbearing exercise such as walking and cycling to stimulate bone health.     [If agreeable, please place orders for Prolia or Fosamax.]   ----- Message -----  From: Interface, Rad Results In  Sent: 6/17/2025   4:57 PM CDT  To: Luis Angeles MD

## 2025-06-19 ENCOUNTER — TELEPHONE (OUTPATIENT)
Dept: INTERNAL MEDICINE | Facility: CLINIC | Age: 75
End: 2025-06-19
Payer: MEDICARE

## 2025-06-19 NOTE — TELEPHONE ENCOUNTER
----- Message from Isaias Farrar sent at 6/19/2025  9:47 AM CDT -----  Regarding: Pre-Visit 06/26/2025  Patient has an appointment on 6/26  Fasting labs done  Please call and remind patient of appointment

## 2025-06-25 PROBLEM — J43.1 PANLOBULAR EMPHYSEMA: Status: ACTIVE | Noted: 2025-06-25

## 2025-06-25 PROBLEM — C56.3: Status: ACTIVE | Noted: 2024-07-09

## 2025-06-25 PROBLEM — Z00.00 ENCOUNTER FOR ANNUAL WELLNESS EXAM IN MEDICARE PATIENT: Status: ACTIVE | Noted: 2025-06-25

## 2025-06-26 ENCOUNTER — OFFICE VISIT (OUTPATIENT)
Dept: INTERNAL MEDICINE | Facility: CLINIC | Age: 75
End: 2025-06-26
Payer: MEDICARE

## 2025-06-26 ENCOUNTER — RESULTS FOLLOW-UP (OUTPATIENT)
Dept: INTERNAL MEDICINE | Facility: CLINIC | Age: 75
End: 2025-06-26

## 2025-06-26 ENCOUNTER — HOSPITAL ENCOUNTER (OUTPATIENT)
Dept: RADIOLOGY | Facility: HOSPITAL | Age: 75
Discharge: HOME | End: 2025-06-26
Attending: INTERNAL MEDICINE
Payer: MEDICARE

## 2025-06-26 ENCOUNTER — TELEPHONE (OUTPATIENT)
Dept: INTERNAL MEDICINE | Facility: CLINIC | Age: 75
End: 2025-06-26

## 2025-06-26 VITALS
SYSTOLIC BLOOD PRESSURE: 100 MMHG | HEIGHT: 60 IN | RESPIRATION RATE: 16 BRPM | BODY MASS INDEX: 21.79 KG/M2 | HEART RATE: 84 BPM | DIASTOLIC BLOOD PRESSURE: 66 MMHG | WEIGHT: 111 LBS | OXYGEN SATURATION: 98 %

## 2025-06-26 DIAGNOSIS — J43.1 PANLOBULAR EMPHYSEMA: ICD-10-CM

## 2025-06-26 DIAGNOSIS — C56.3 MALIGNANT NEOPLASM OF BILATERAL OVARIES: ICD-10-CM

## 2025-06-26 DIAGNOSIS — E78.2 MIXED HYPERLIPIDEMIA: Chronic | ICD-10-CM

## 2025-06-26 DIAGNOSIS — M81.0 AGE-RELATED OSTEOPOROSIS WITHOUT CURRENT PATHOLOGICAL FRACTURE: ICD-10-CM

## 2025-06-26 DIAGNOSIS — M25.552 ACUTE HIP PAIN, LEFT: Primary | ICD-10-CM

## 2025-06-26 DIAGNOSIS — I10 ESSENTIAL (PRIMARY) HYPERTENSION: Chronic | ICD-10-CM

## 2025-06-26 DIAGNOSIS — M25.552 ACUTE HIP PAIN, LEFT: ICD-10-CM

## 2025-06-26 DIAGNOSIS — M25.511 ACUTE PAIN OF RIGHT SHOULDER: ICD-10-CM

## 2025-06-26 DIAGNOSIS — Z00.00 ENCOUNTER FOR ANNUAL WELLNESS EXAM IN MEDICARE PATIENT: Primary | ICD-10-CM

## 2025-06-26 DIAGNOSIS — R79.89 LOW VITAMIN D LEVEL: Chronic | ICD-10-CM

## 2025-06-26 PROCEDURE — 73502 X-RAY EXAM HIP UNI 2-3 VIEWS: CPT | Mod: TC,LT

## 2025-06-26 PROCEDURE — 73030 X-RAY EXAM OF SHOULDER: CPT | Mod: TC,RT

## 2025-06-26 RX ORDER — MULTIVITAMIN
1 TABLET ORAL DAILY
COMMUNITY

## 2025-06-26 RX ORDER — CELECOXIB 200 MG/1
200 CAPSULE ORAL 2 TIMES DAILY
Qty: 28 CAPSULE | Refills: 0 | Status: SHIPPED | OUTPATIENT
Start: 2025-06-26 | End: 2025-07-10

## 2025-06-26 NOTE — TELEPHONE ENCOUNTER
X-rays of the shoulder in the hip showed no significant abnormality   Begin Celebrex 200 mg daily for 2 weeks   If pain continues refer to orthopedic

## 2025-06-26 NOTE — PROGRESS NOTES
Luis Angeles MD        PATIENT NAME: Melina Lloyd  : 1950  DATE: 25  MRN: [id number]      Patient Care Team:  Luis Angeles MD as PCP - General (Internal Medicine)  Liza Marquez MD (Gynecology)       Billing Provider: Luis Angeles MD  Level of Service: [place] MEDIC[place] ANNUAL WELLNESS SUBSEQUENT VISIT  Patient PCP Information       Provider PCP Type    Luis Angeles MD General            Reason for Visit / Chief Complaint: Medicare AWV Follow Up (Patient is here for her Medicare wellness. Patient did labs prior to appointment)       Update PCP  Update Chief Complaint         History of Present Illness / Problem Focused Workflow     Melina Lloyd presents to the clinic with Medicare AWV Follow Up (Patient is here for her Medicare wellness. Patient did labs prior to appointment)     Melina is here for annual wellness exam   She has got some complaints of severe right shoulder pain and left hip pain           Review of Systems   Review of Systems   Constitutional: Negative.    HENT: Negative.     Eyes: Negative.    Respiratory: Negative.     Cardiovascular: Negative.    Gastrointestinal: Negative.    Endocrine: Negative.    Genitourinary: Negative.    Musculoskeletal: Negative.    Integumentary:  Negative.   Neurological: Negative.    Psychiatric/Behavioral: Negative.          Patient Reported Health Risk Assessment       Medical / Social / Family History     Past Medical History:   Diagnosis Date    Essential (primary) hypertension     Low vitamin D level     Mixed hyperlipidemia        Past Surgical History:   Procedure Laterality Date     SECTION      HYSTERECTOMY         Social History  Ms. Lloyd  reports that she has been smoking cigarettes. She started smoking about 33 years ago. She has a 33.5 pack-year smoking history. She has never used smokeless tobacco. She reports that she does not currently use alcohol. She reports that she does not use  "drugs.    Family History  Ms.'s Lloyd  family history includes Asthma in her father; Breast cancer in her maternal aunt; Hypertension in her mother; Liver cancer in her father; Lung cancer in her father.        Medications and Allergies     Medications  Outpatient Medications Marked as Taking for the 6/26/25 encounter (Office Visit) with Luis Angeles MD   Medication Sig Dispense Refill    amLODIPine (NORVASC) 5 MG tablet Take 1 tablet (5 mg total) by mouth once daily. 90 tablet 0    atorvastatin (LIPITOR) 40 MG tablet Take 1 tablet by mouth once daily 90 tablet 0    cholecalciferol, vitamin D3, (VITAMIN D3) 50 mcg (2,000 unit) Cap capsule Take 2,000 Units by mouth.      hydroCHLOROthiazide (HYDRODIURIL) 12.5 MG Tab Take 1 tablet (12.5 mg total) by mouth once daily. 90 tablet 3    metoprolol tartrate (LOPRESSOR) 25 MG tablet Take 1 tablet by mouth once daily 90 tablet 0    multivitamin (THERAGRAN) per tablet Take 1 tablet by mouth once daily.         Allergies  Review of patient's allergies indicates:   Allergen Reactions    Shellfish containing products Anaphylaxis and Shortness Of Breath     Only crabs    Only crabs   "Throat tightened up" SOB    Prednisone Itching       Physical Examination     Vitals:    06/26/25 0843   BP: 100/66   Pulse: 84   Resp: 16     Physical Exam  Constitutional:       Appearance: Normal appearance.   HENT:      Head: Normocephalic and atraumatic.      Right Ear: Tympanic membrane normal.      Left Ear: Tympanic membrane normal.      Nose: Nose normal.      Mouth/Throat:      Mouth: Mucous membranes are moist.   Eyes:      Extraocular Movements: Extraocular movements intact.      Pupils: Pupils are equal, round, and reactive to light.   Cardiovascular:      Rate and Rhythm: Normal rate and regular rhythm.      Pulses: Normal pulses.   Pulmonary:      Effort: Pulmonary effort is normal.      Breath sounds: Normal breath sounds.   Abdominal:      General: Abdomen is flat. Bowel " sounds are normal.      Palpations: Abdomen is soft.   Musculoskeletal:         General: Normal range of motion.      Cervical back: Normal range of motion and neck supple.      Comments: Right shoulder pain with range of motion  Left hip pain with range of motion   Skin:     General: Skin is warm and dry.   Neurological:      General: No focal deficit present.      Mental Status: She is alert and oriented to person, place, and time.   Psychiatric:         Mood and Affect: Mood normal.         Behavior: Behavior normal.               No data to display                  6/26/2025     8:40 AM 1/14/2025     1:40 PM 11/12/2024     8:20 AM 9/4/2024     1:20 PM 7/9/2024     8:20 AM 6/24/2024     8:20 AM 1/9/2024    10:20 AM   Fall Risk Assessment - Outpatient   Mobility Status Ambulatory Ambulatory Ambulatory Ambulatory Ambulatory Ambulatory Ambulatory   Number of falls 0 0 0 0 0 0 0   Identified as fall risk False False False False False False False                Assessment and Plan (including Health Maintenance)      Problem List  Smart Sets  Document Outside HM   :    Plan:       ICD-10-CM ICD-9-CM   1. Encounter for annual wellness exam in Medicare patient  Z00.00 V70.0   2. Essential (primary) hypertension  I10 401.9   3. Mixed hyperlipidemia  E78.2 272.2   4. Low vitamin D level  R79.89 790.6   5. Panlobular emphysema  J43.1 492.8   6. Malignant neoplasm of bilateral ovaries  C56.3 183.0   7. Acute hip pain, left  M25.552 719.45   8. Acute pain of right shoulder  M25.511 719.41   9. Age-related osteoporosis without current pathological fracture  M81.0 733.01               Health Maintenance Due   Topic Date Due    RSV Vaccine (Age 60+ and Pregnant patients) (1 - Risk 60-74 years 1-dose series) Never done       Problem List Items Addressed This Visit          Pulmonary    Panlobular emphysema (Chronic)    Current Assessment & Plan   Reviewed CT from January no evidence of cancer she continues is here  "pulmonologist.            Cardiac/Vascular    Mixed hyperlipidemia (Chronic)    Current Assessment & Plan   Levels excellent continue treatment         Essential (primary) hypertension (Chronic)    Current Assessment & Plan   Good blood pressure controlled continue medication            Oncology    Malignant neoplasm of bilateral ovaries    Overview   [July 2024] CT Abdomen Pelvis  CT Abdomen Pelvis:   "Bilateral solid and heterogeneous pelvic masses which appear to be ovarian in etiology, larger and more heterogeneous on the right. Pelvic ultrasound could provide further characterization. These are likely bilateral ovarian neoplasms."          Current Assessment & Plan   Stable with no recurrence            Endocrine    Low vitamin D level (Chronic)    Current Assessment & Plan   Continue vitamin-D therapy         Age related osteoporosis (Chronic)    Current Assessment & Plan   Discussion on osteoporosis in her bone density   We will begin Prolia therapy.            Orthopedic    Acute hip pain, left    Current Assessment & Plan   X-rays ordered for today         Acute pain of right shoulder    Current Assessment & Plan   X-rays ordered for today            Other    Encounter for annual wellness exam in Medicare patient - Primary    Current Assessment & Plan   Discussed results of laboratory examination   She will return in six-month            Health Maintenance Topics with due status: Not Due       Topic Last Completion Date    Colorectal Cancer Screening 08/18/2018    TETANUS VACCINE 06/29/2023    LDCT Lung Screen 01/08/2025    Lipid Panel 06/17/2025    DEXA Scan 06/17/2025    Mammogram 06/19/2025       No future appointments.       Medicare Annual Wellness and Personalized Prevention Plan:   Fall Risk + Home Safety + Hearing Impairment + Depression Screen + Cognitive Impairment Screen + Health Risk Assessment all reviewed.         Advance Care Planning     Date: 06/26/2025  Patient did not wish or was not able " to name a surrogate decision maker or provide an Advance Care Plan.           Opioid Screening: Patient medication list reviewed, patient is not taking prescription opioids. Patient is not using additional opioids than prescribed. Patient is at low risk of substance abuse based on this opioid use history.        Signature:  Luis Schreiber MD  [facility]  [address] [facility] [place] [address]    Date of encounter: 6/26/25    Follow up in about 6 months (around 12/26/2025), or Nabil, for Follow Up. In addition to their scheduled follow up, the patient has also been instructed to follow up on as needed basis.

## 2025-06-26 NOTE — TELEPHONE ENCOUNTER
Spoke with pt regarding lab results, pt verbalized understanding of results. Spoke with pt regarding medication sent to pharmacy, informed pt of medication sent to pharmacy

## 2025-07-06 DIAGNOSIS — E78.2 MIXED HYPERLIPIDEMIA: Chronic | ICD-10-CM

## 2025-07-06 DIAGNOSIS — I10 ESSENTIAL (PRIMARY) HYPERTENSION: Chronic | ICD-10-CM

## 2025-07-07 RX ORDER — AMLODIPINE BESYLATE 5 MG/1
5 TABLET ORAL
Qty: 90 TABLET | Refills: 5 | Status: SHIPPED | OUTPATIENT
Start: 2025-07-07

## 2025-07-07 RX ORDER — ATORVASTATIN CALCIUM 40 MG/1
40 TABLET, FILM COATED ORAL
Qty: 90 TABLET | Refills: 5 | Status: SHIPPED | OUTPATIENT
Start: 2025-07-07

## 2025-07-07 RX ORDER — METOPROLOL TARTRATE 25 MG/1
25 TABLET, FILM COATED ORAL
Qty: 90 TABLET | Refills: 5 | Status: SHIPPED | OUTPATIENT
Start: 2025-07-07

## 2025-07-07 RX ORDER — HYDROCHLOROTHIAZIDE 12.5 MG/1
12.5 TABLET ORAL
Qty: 90 TABLET | Refills: 5 | Status: SHIPPED | OUTPATIENT
Start: 2025-07-07

## 2025-07-16 ENCOUNTER — OFFICE VISIT (OUTPATIENT)
Dept: URGENT CARE | Facility: CLINIC | Age: 75
End: 2025-07-16
Payer: MEDICARE

## 2025-07-16 VITALS
SYSTOLIC BLOOD PRESSURE: 139 MMHG | HEART RATE: 94 BPM | BODY MASS INDEX: 21.79 KG/M2 | DIASTOLIC BLOOD PRESSURE: 70 MMHG | TEMPERATURE: 98 F | HEIGHT: 60 IN | OXYGEN SATURATION: 100 % | WEIGHT: 111 LBS | RESPIRATION RATE: 18 BRPM

## 2025-07-16 DIAGNOSIS — R09.89 SYMPTOMS OF UPPER RESPIRATORY INFECTION (URI): ICD-10-CM

## 2025-07-16 DIAGNOSIS — J06.9 UPPER RESPIRATORY TRACT INFECTION, UNSPECIFIED TYPE: Primary | ICD-10-CM

## 2025-07-16 LAB
CTP QC/QA: YES
SARS-COV+SARS-COV-2 AG RESP QL IA.RAPID: NEGATIVE

## 2025-07-16 PROCEDURE — 99214 OFFICE O/P EST MOD 30 MIN: CPT | Mod: PBBFAC

## 2025-07-16 PROCEDURE — 87811 SARS-COV-2 COVID19 W/OPTIC: CPT | Mod: PBBFAC

## 2025-07-16 PROCEDURE — 99214 OFFICE O/P EST MOD 30 MIN: CPT | Mod: S$PBB,,,

## 2025-07-16 RX ORDER — ALBUTEROL SULFATE 90 UG/1
2 INHALANT RESPIRATORY (INHALATION) EVERY 4 HOURS PRN
Qty: 51 G | Refills: 0 | Status: SHIPPED | OUTPATIENT
Start: 2025-07-16 | End: 2026-07-16

## 2025-07-16 RX ORDER — DEXBROMPHENIRAMINE MALEATE, PHENYLEPHRINE HYDROCHLORIDE 2; 7.5 MG/1; MG/1
2-7.5 TABLET ORAL 4 TIMES DAILY PRN
Qty: 28 TABLET | Refills: 0 | Status: SHIPPED | OUTPATIENT
Start: 2025-07-16 | End: 2025-07-23

## 2025-07-16 NOTE — PROGRESS NOTES
"Subjective:       Patient ID: Melina Lloyd is a 74 y.o. female.    Vitals:  height is 5' (1.524 m) and weight is 50.3 kg (111 lb). Her temperature is 98.1 °F (36.7 °C). Her blood pressure is 139/70 and her pulse is 94. Her respiration is 18 and oxygen saturation is 100%.     Chief Complaint: URI (Cough, wheezing, dizziness, "sweats" in the last 2 days. )    74-year-old female reports to the clinic with complaints of cough, wheezing, dizziness, and night sweats that began the last 2 days.  Patient denies any fever and states that she does have a history of emphysema and that wheezing began prior to other symptoms.  Patient states she has taken over-the-counter cold medication with little relief and states symptoms seem to be worsening.  Patient denies fever, nausea, vomiting, diarrhea, abdominal pain, headache, chills, fatigue, body aches.    All other systems are negative    Chart reviewed    Objective:   Physical Exam   Constitutional: She appears well-developed.  Non-toxic appearance. She does not appear ill. No distress.   HENT:   Head: Normocephalic and atraumatic.   Ears:   Right Ear: Hearing, tympanic membrane, external ear and ear canal normal.   Left Ear: Hearing, tympanic membrane, external ear and ear canal normal.   Nose: Mucosal edema and rhinorrhea present. No purulent discharge. Right sinus exhibits no maxillary sinus tenderness and no frontal sinus tenderness. Left sinus exhibits no maxillary sinus tenderness and no frontal sinus tenderness.   Mouth/Throat: Uvula is midline. Oropharyngeal exudate (Postnasal drip present), posterior oropharyngeal edema and posterior oropharyngeal erythema present.   Eyes: Right eye exhibits no discharge. Left eye exhibits no discharge. Extraocular movement intact   Neck: Neck supple. No neck rigidity present.   Cardiovascular: Regular rhythm.   Pulmonary/Chest: Effort normal and breath sounds normal. No respiratory distress. She has no wheezes. She has no rhonchi. " She has no rales.   Lymphadenopathy:     She has no cervical adenopathy.   Neurological: She is alert.   Skin: Skin is warm, dry and not diaphoretic.   Psychiatric: Her behavior is normal.   Nursing note and vitals reviewed.      Assessment:     1. Upper respiratory tract infection, unspecified type    2. Symptoms of upper respiratory infection (URI)        Results for orders placed or performed in visit on 07/16/25   SARS Coronavirus 2 Antigen, POCT Manual Read    Collection Time: 07/16/25 12:01 PM   Result Value Ref Range    SARS Coronavirus 2 Antigen Negative Negative, Presumptive Negative     Acceptable Yes         Plan:   Discussed negative COVID results with patient  Begin taking albuterol every 4 hours for wheezing and shortness of breath  Begin taking Patricia his as needed for congestion  Discussed over-the-counter remedies such as warm saltwater gargles, nasal saline rinses, or warmth tea with honey  Please read all educational materials and discharge instructions carefully  Follow-up with primary care provider as instructed   Discussed signs and symptoms of worsening illness that would warrant further evaluation in the emergency department:  Patient verbalized understanding of these instructions       Upper respiratory tract infection, unspecified type  -     albuterol (VENTOLIN HFA) 90 mcg/actuation inhaler; Inhale 2 puffs into the lungs every 4 (four) hours as needed for Wheezing or Shortness of Breath. Rescue  Dispense: 51 g; Refill: 0  -     dexbrompheniramine-phenyleph (ALAHIST PE) 2-7.5 mg Tab; Take 2-7.5 mg by mouth 4 (four) times daily as needed (congestion).  Dispense: 28 tablet; Refill: 0    Symptoms of upper respiratory infection (URI)  -     SARS Coronavirus 2 Antigen, POCT Manual Read        Please note: This chart was completed via voice to text dictation. It may contain typographical/word recognition errors. If there are any questions, please contact the provider for final  clarification.

## 2025-07-29 ENCOUNTER — TELEPHONE (OUTPATIENT)
Dept: INTERNAL MEDICINE | Facility: CLINIC | Age: 75
End: 2025-07-29
Payer: MEDICARE

## 2025-07-29 NOTE — TELEPHONE ENCOUNTER
Copied from CRM #[id number]. Topic: General Inquiry - Patient Advice  >> Jul 29, 2025  1:56 PM Belia wrote:  .Who Called: Melina Lloyd    What order is the patient requesting: na   When does the expect the orders to be performed? na        Preferred Method of Contact: Phone Call  Patient's Preferred Phone Number on File: [phone]   Best Call Back Number, if different:  Additional Information: pt wants nurse to give her a call regarding orders to Infusion Center

## 2025-07-29 NOTE — TELEPHONE ENCOUNTER
Patient was here on 6/26/25 and stated that the LPN in office told her that the orders were placed for Prolia injection but told she called the infusion center to follow up and they told her no orders were placed. Can you please place orders for Prolia.    I informed patient that I cannot put these orders in because I am a MA and only a LPN can do it. I informed her that our LPN is out until Thursday, she voiced understanding and thanks.

## 2025-07-31 ENCOUNTER — TELEPHONE (OUTPATIENT)
Dept: INFUSION THERAPY | Facility: HOSPITAL | Age: 75
End: 2025-07-31
Payer: MEDICARE

## 2025-08-07 ENCOUNTER — TELEPHONE (OUTPATIENT)
Dept: INTERNAL MEDICINE | Facility: CLINIC | Age: 75
End: 2025-08-07
Payer: MEDICARE

## 2025-08-07 NOTE — TELEPHONE ENCOUNTER
Copied from CRM #[id number]. Topic: General Inquiry - Patient Advice  >> Aug 7, 2025 11:15 AM Joan Solares wrote:  Who Called: Melina Lindon    Caller is requesting assistance/information from provider's office.    Symptoms (please be specific): N/A   How long has patient had these symptoms:  N/A  List of preferred pharmacies on file (remove unneeded): [unfilled]  If different, enter pharmacy into here including location and phone number: N/A      Preferred Method of Contact: Phone Call  Patient's Preferred Phone Number on File: [phone]   Best Call Back Number, if different:  Additional Information: pt would like a cb in regards to order that was supposed to be sent to the infusion clinic for her prolia shot

## 2025-08-07 NOTE — TELEPHONE ENCOUNTER
Informed patient that Prolia was ordered on July 31 and gave her the number to the infusion center. Informed her that she can try reaching out to them

## 2025-08-08 ENCOUNTER — OFFICE VISIT (OUTPATIENT)
Dept: URGENT CARE | Facility: CLINIC | Age: 75
End: 2025-08-08
Payer: MEDICARE

## 2025-08-08 VITALS
WEIGHT: 113 LBS | HEART RATE: 116 BPM | DIASTOLIC BLOOD PRESSURE: 74 MMHG | BODY MASS INDEX: 22.19 KG/M2 | TEMPERATURE: 98 F | HEIGHT: 60 IN | OXYGEN SATURATION: 97 % | RESPIRATION RATE: 18 BRPM | SYSTOLIC BLOOD PRESSURE: 119 MMHG

## 2025-08-08 DIAGNOSIS — R35.0 URINARY FREQUENCY: ICD-10-CM

## 2025-08-08 DIAGNOSIS — M54.50 ACUTE LOW BACK PAIN, UNSPECIFIED BACK PAIN LATERALITY, UNSPECIFIED WHETHER SCIATICA PRESENT: Primary | ICD-10-CM

## 2025-08-08 LAB
BACTERIA #/AREA URNS AUTO: ABNORMAL /HPF
BILIRUB UR QL STRIP.AUTO: NEGATIVE
BILIRUB UR QL STRIP: POSITIVE
CLARITY UR: CLEAR
COLOR UR AUTO: YELLOW
GLUCOSE UR QL STRIP: NEGATIVE
GLUCOSE UR QL STRIP: NORMAL
HGB UR QL STRIP: NEGATIVE
HYALINE CASTS #/AREA URNS LPF: ABNORMAL /LPF
KETONES UR QL STRIP: NEGATIVE
KETONES UR QL STRIP: POSITIVE
LEUKOCYTE ESTERASE UR QL STRIP: NEGATIVE
LEUKOCYTE ESTERASE UR QL STRIP: NEGATIVE
MUCOUS THREADS URNS QL MICRO: ABNORMAL /LPF
NITRITE UR QL STRIP: NEGATIVE
PH UR STRIP: 6 [PH]
PH, POC UA: 6
POC BLOOD, URINE: NEGATIVE
POC NITRATES, URINE: NEGATIVE
PROT UR QL STRIP: ABNORMAL
PROT UR QL STRIP: POSITIVE
RBC #/AREA URNS AUTO: ABNORMAL /HPF
SP GR UR STRIP.AUTO: 1.02 (ref 1–1.03)
SP GR UR STRIP: 1.01 (ref 1–1.03)
SQUAMOUS #/AREA URNS LPF: ABNORMAL /HPF
UROBILINOGEN UR STRIP-ACNC: 0.2 (ref 0.1–1.1)
UROBILINOGEN UR STRIP-ACNC: NORMAL
WBC #/AREA URNS AUTO: ABNORMAL /HPF

## 2025-08-08 PROCEDURE — 99215 OFFICE O/P EST HI 40 MIN: CPT | Mod: PBBFAC | Performed by: FAMILY MEDICINE

## 2025-08-08 PROCEDURE — 81001 URINALYSIS AUTO W/SCOPE: CPT | Performed by: FAMILY MEDICINE

## 2025-08-08 RX ORDER — MELOXICAM 7.5 MG/1
7.5 TABLET ORAL DAILY
Qty: 14 TABLET | Refills: 0 | Status: SHIPPED | OUTPATIENT
Start: 2025-08-08

## 2025-08-08 NOTE — LETTER
August 8, 2025      [facility]  [address] [place] [address]  Phone: [phone]       Patient: Melina Lloyd   YOB: 1950  Date of Visit: 08/08/2025    To Whom It May Concern:    Roxanne Lloyd  was at [facility] on 08/08/2025. The patient may return to work/school on AUG 10 2025 with no restrictions. If you have any questions or concerns, or if I can be of further assistance, please do not hesitate to contact me.    Sincerely,    MOI BERNSTEIN MD

## 2025-08-08 NOTE — PROGRESS NOTES
Subjective:       Patient ID: Melina Lloyd is a 74 y.o. female.    Vitals:  height is 5' (1.524 m) and weight is 51.3 kg (113 lb). Her temperature is 98.1 °F (36.7 °C). Her blood pressure is 119/74 and her pulse is 116 (abnormal). Her respiration is 18 and oxygen saturation is 97%.     Chief Complaint: Urinary Frequency (X 1wk)    Noticed urinary frequency for 1 day last week, that has resolved.  Having bilateral paralumbar pain for several days, positional.  No radicular symptoms.  No bowel or bladder symptoms.  No specific precipitating event.  Tried Tylenol with some relief.  She works as a caregiver, does some mild lifting.  Denies dysuria.  No abdominal pain.  No fever    All other systems are negative    Chart reviewed    Objective:   Physical Exam   Constitutional: She appears well-developed.  Non-toxic appearance. She does not appear ill. No distress.      Comments:Very pleasant     Abdominal: Normal appearance.   Musculoskeletal:      Thoracic back: Normal.      Lumbar back: She exhibits decreased range of motion and tenderness. She exhibits no bony tenderness, no swelling and no deformity.        Back:    Neurological: no focal deficit. She is alert. She displays no weakness.   Skin: Skin is not diaphoretic. Capillary refill takes less than 2 seconds.   Psychiatric: Her behavior is normal. Mood normal.   Nursing note and vitals reviewed.    Results for orders placed or performed in visit on 08/08/25   POCT Urinalysis, Dipstick, Automated, W/O Scope    Collection Time: 08/08/25  2:22 PM   Result Value Ref Range    POC Blood, Urine Negative Negative    POC Bilirubin, Urine Positive (A) Negative    POC Urobilinogen, Urine 0.2 0.1 - 1.1    POC Ketones, Urine Positive (A) Negative    POC Protein, Urine Positive (A) Negative    POC Nitrates, Urine Negative Negative    POC Glucose, Urine Negative Negative    pH, UA 6.0     POC Specific Gravity, Urine 1.010 1.003 - 1.029    POC Leukocytes, Urine Negative  Negative         Assessment:     1. Acute low back pain, unspecified back pain laterality, unspecified whether sciatica present    2. Urinary frequency            Plan:   For thoroughness, will send urine for micro and notify if indicated.  Findings are most consistent with musculoskeletal origin.  Prescribed a few meloxicam with side effect precautions.  She would rather not try a muscle relaxer, and I concur.  Discussed heating pad on low, over-the-counter topical analgesics, light massage and stretching when able.  States she is looking into joining a wellness program and I encouraged that.  Follow-up with PCP or return to urgent care if not improving in several days, sooner if new or worsening symptoms develop.      Acute low back pain, unspecified back pain laterality, unspecified whether sciatica present  -     Urinalysis, Reflex to Urine Culture Urine, Clean Catch  -     meloxicam (MOBIC) 7.5 MG tablet; Take 1 tablet (7.5 mg total) by mouth once daily. May take daily with food as needed for pain.  Discontinue if GI symptoms developed  Dispense: 14 tablet; Refill: 0    Urinary frequency  -     POCT Urinalysis, Dipstick, Automated, W/O Scope        Portions of this report were dictated using voice recognition software. Content is subject to voice recognition errors

## 2025-08-11 ENCOUNTER — TELEPHONE (OUTPATIENT)
Dept: INTERNAL MEDICINE | Facility: CLINIC | Age: 75
End: 2025-08-11
Payer: MEDICARE

## 2025-08-11 ENCOUNTER — INFUSION (OUTPATIENT)
Dept: INFUSION THERAPY | Facility: HOSPITAL | Age: 75
End: 2025-08-11
Attending: INTERNAL MEDICINE
Payer: MEDICARE

## 2025-08-11 DIAGNOSIS — M81.0 AGE-RELATED OSTEOPOROSIS WITHOUT CURRENT PATHOLOGICAL FRACTURE: Primary | ICD-10-CM

## 2025-08-11 PROCEDURE — 63600175 PHARM REV CODE 636 W HCPCS: Mod: JZ,TB | Performed by: INTERNAL MEDICINE

## 2025-08-11 PROCEDURE — 96372 THER/PROPH/DIAG INJ SC/IM: CPT

## 2025-08-11 RX ADMIN — DENOSUMAB 60 MG: 60 INJECTION SUBCUTANEOUS at 01:08
